# Patient Record
Sex: FEMALE | Race: WHITE | NOT HISPANIC OR LATINO | Employment: OTHER | URBAN - NONMETROPOLITAN AREA
[De-identification: names, ages, dates, MRNs, and addresses within clinical notes are randomized per-mention and may not be internally consistent; named-entity substitution may affect disease eponyms.]

---

## 2017-06-25 ENCOUNTER — HOSPITAL ENCOUNTER (EMERGENCY)
Facility: HOSPITAL | Age: 42
Discharge: HOME | End: 2017-06-25
Attending: PHYSICIAN ASSISTANT | Admitting: PHYSICIAN ASSISTANT
Payer: COMMERCIAL

## 2017-06-25 VITALS
RESPIRATION RATE: 16 BRPM | TEMPERATURE: 98.2 F | HEIGHT: 65 IN | DIASTOLIC BLOOD PRESSURE: 73 MMHG | SYSTOLIC BLOOD PRESSURE: 108 MMHG

## 2017-06-25 DIAGNOSIS — J20.9 ACUTE BRONCHITIS, UNSPECIFIED ORGANISM: ICD-10-CM

## 2017-06-25 PROCEDURE — 99213 OFFICE O/P EST LOW 20 MIN: CPT | Performed by: PHYSICIAN ASSISTANT

## 2017-06-25 PROCEDURE — 99213 OFFICE O/P EST LOW 20 MIN: CPT

## 2017-06-25 RX ORDER — AZITHROMYCIN 250 MG/1
TABLET, FILM COATED ORAL
Qty: 6 TABLET | Refills: 0 | Status: SHIPPED | OUTPATIENT
Start: 2017-06-25 | End: 2017-06-30

## 2017-06-25 ASSESSMENT — ENCOUNTER SYMPTOMS
DIZZINESS: 0
EYE DISCHARGE: 0
VOMITING: 0
COUGH: 1
DIARRHEA: 0
APPETITE CHANGE: 0
SINUS PRESSURE: 0
ABDOMINAL PAIN: 0
VOICE CHANGE: 0
CARDIOVASCULAR NEGATIVE: 1
LIGHT-HEADEDNESS: 0
EYE REDNESS: 0
NECK STIFFNESS: 0
PSYCHIATRIC NEGATIVE: 1
SORE THROAT: 0
FATIGUE: 1
FEVER: 0
NAUSEA: 0
NECK PAIN: 0
HEADACHES: 0
TROUBLE SWALLOWING: 0

## 2017-06-25 NOTE — ED PROVIDER NOTES
"  History     Chief Complaint   Patient presents with     Cough     \"I feel like my chest has congestion in it\".     The history is provided by the patient. No  was used.     Shania Hammond is a 42 year old female who has over 3 days of cough/congestion. Denies n/v/d/f/c. No ear pain. No sinus pain/pressure. Has decreased energy. No sore throat    I have reviewed the Medications, Allergies, Past Medical and Surgical History, and Social History in the Epic system.    Allergies:   Allergies   Allergen Reactions     Morphine Shortness Of Breath     Bactrim [Sulfamethoxazole W-Trimethoprim] Other (See Comments)     Very bad intolerance does not wish to take.     Amoxicillin Rash         No current facility-administered medications on file prior to encounter.   Current Outpatient Prescriptions on File Prior to Encounter:  Thiamine HCl (VITAMIN B-1 PO)    loratadine (CLARITIN) 10 MG tablet Take 10 mg by mouth daily   omeprazole (PRILOSEC) 40 MG capsule Take 1 capsule (40 mg) by mouth daily Take 30-60 minutes before a meal.       Patient Active Problem List   Diagnosis     Postpartum care and examination immediately after delivery     Tobacco abuse     Rosacea     Allergic rhinitis     Migraine       Past Surgical History:   Procedure Laterality Date      SECTION  ,      DILATION AND CURETTAGE  ?     EGD with biopsy      Gastroesophageal reflux disease     GYN SURGERY  2013     , leep       Social History   Substance Use Topics     Smoking status: Current Every Day Smoker     Packs/day: 0.50     Years: 1.00     Types: Cigarettes     Smokeless tobacco: Never Used      Comment: Passive Exposure: Yes     Alcohol use 0.0 oz/week     0 Standard drinks or equivalent per week      Comment: rare/ 4 drink max once a week or sometimes more       Most Recent Immunizations   Administered Date(s) Administered     Influenza (IIV3) 2008     Pneumococcal 23 valent 2012     " "TDAP Vaccine (Boostrix) 05/13/2010       BMI: Estimated body mass index is 30.79 kg/(m^2) as calculated from the following:    Height as of 7/8/16: 1.651 m (5' 5\").    Weight as of 7/8/16: 83.9 kg (185 lb).      Review of Systems   Constitutional: Positive for fatigue. Negative for appetite change and fever.   HENT: Positive for congestion. Negative for ear pain, sinus pressure, sore throat, trouble swallowing and voice change.    Eyes: Negative for discharge and redness.   Respiratory: Positive for cough.    Cardiovascular: Negative.    Gastrointestinal: Negative for abdominal pain, diarrhea, nausea and vomiting.   Genitourinary: Negative.    Musculoskeletal: Negative for neck pain and neck stiffness.   Skin: Negative for rash.   Neurological: Negative for dizziness, light-headedness and headaches.   Psychiatric/Behavioral: Negative.        Physical Exam   BP: 108/73  Heart Rate: 77  Temp: 98.2  F (36.8  C)  Resp: 16  Height: 165.1 cm (5' 5\")  Physical Exam   Constitutional: She is oriented to person, place, and time. She appears well-developed and well-nourished. No distress.   HENT:   Head: Normocephalic and atraumatic.   Right Ear: External ear normal.   Left Ear: External ear normal.   Mouth/Throat: Oropharynx is clear and moist.   Bilateral TMs/canals clear/wnl  No sinus TTP     Eyes: Conjunctivae and EOM are normal. Right eye exhibits no discharge. Left eye exhibits no discharge.   Neck: Normal range of motion. Neck supple.   Cardiovascular: Normal rate, regular rhythm and normal heart sounds.    Pulmonary/Chest: Effort normal. No respiratory distress.   Coarse BS RUL, does not clear with cough   Abdominal: Soft. Bowel sounds are normal. She exhibits no distension. There is no tenderness.   Neurological: She is alert and oriented to person, place, and time.   Skin: Skin is warm and dry. No rash noted. She is not diaphoretic.   Psychiatric: She has a normal mood and affect.   Nursing note and vitals " reviewed.      ED Course     ED Course     Procedures          Assessments & Plan (with Medical Decision Making)     I have reviewed the nursing notes.    I have reviewed the findings, diagnosis, plan and need for follow up with the patient.      Discharge Medication List as of 6/25/2017  4:35 PM      START taking these medications    Details   azithromycin (ZITHROMAX Z-FRANKI) 250 MG tablet Two tablets on the first day, then one tablet daily for the next 4 days, Disp-6 tablet, R-0, E-Prescribe             Final diagnoses:   Acute bronchitis, unspecified organism         Patient verbally educated and given appropriate education sheets for each of the diagnoses and has no questions.  Take OTC motrin or tylenol as directed on the bottle as needed.  Take prescription medications as directed.  Increase fluids, wash hands often.  Sleep in a recliner or with multiple pillows until this has resolved.  Follow up with your provider if symptoms increase or if concerns develop, return to the ER.  Kera Duran Certified   Physician Assistant  6/25/2017  6:24 PM  URGENT CARE CLINIC    6/25/2017   [facility]     Kera Duran PA  06/25/17 1824       Kera Duran PA  06/25/17 1826

## 2017-06-25 NOTE — ED AVS SNAPSHOT
[facility]    [address] [address]    Phone:  [phone]                                       Shania Hammond   MRN: [id number]    Department:  [facility]   Date of Visit:  6/25/2017           Patient Information     Date Of Birth          1975        Your diagnoses for this visit were:     Acute bronchitis, unspecified organism        You were seen by Kera Duran PA.      Follow-up Information     Follow up with Haile Mckay DO.    Specialty:  Family Practice    Why:  If symptoms worsen    Contact information:    [facility]  [address] [facility] [address] [phone]          Follow up with [facility].    Specialty:  EMERGENCY MEDICINE    Why:  If further concerns develop    Contact information:    [address] [address] [phone]    Additional information:    From [facility]: Take [address]. Turn left at [address]. Turn left at the first stoplight on [address]. At the first stop sign, take a right onto [address]. Take a left into the parking lot and continue through until you reach the North enterance of the building.       From [place]: Take [address]. Take the [address] ramp towards [place]. Turn left onto [address]. Take a slight right onto [address]. Turn left at the first stoplight on [address]. At the first stop sign, take a right onto [address]. Take a left into the parking lot and continue through until you reach the North enterance of the building.       From [place]: Take [address]. Take a right at [address]. At the first stop sign, take a right onto [address]. Take a left into the parking lot and continue through until you reach the North enterance of the building.         Discharge Instructions       Sleep in the recliner or with 4-5 pillows.    Discharge  References/Attachments     BRONCHITIS, ANTIOBIOTIC TREATMENT (ADULT) (ENGLISH)         Review of your medicines      START taking        Dose / Directions Last dose taken    azithromycin 250 MG tablet   Commonly known as:  ZITHROMAX Z-FRANKI   Quantity:  6 tablet        Two tablets on the first day, then one tablet daily for the next 4 days   Refills:  0          Our records show that you are taking the medicines listed below. If these are incorrect, please call your family doctor or clinic.        Dose / Directions Last dose taken    CLARITIN 10 MG tablet   Dose:  10 mg   Generic drug:  loratadine        Take 10 mg by mouth daily   Refills:  0        omeprazole 40 MG capsule   Commonly known as:  priLOSEC   Dose:  40 mg   Quantity:  30 capsule        Take 1 capsule (40 mg) by mouth daily Take 30-60 minutes before a meal.   Refills:  1        VITAMIN B-1 PO        Refills:  0        VITAMIN D (CHOLECALCIFEROL) PO        Take by mouth daily   Refills:  0                Prescriptions were sent or printed at these locations (1 Prescription)                   [facility] [address] [place], [place] - [address] [facility] [address]   [address] [facility] [address] [address]    Telephone:  [phone]   Fax:  [phone]   Hours:                  E-Prescribed (1 of 1)         azithromycin (ZITHROMAX Z-FRANKI) 250 MG tablet                Orders Needing Specimen Collection     None      Pending Results     No orders found from 6/23/2017 to 6/26/2017.            Pending Culture Results     No orders found from 6/23/2017 to 6/26/2017.            Thank you for choosing [facility]       Thank you for choosing [facility] for your care. Our goal is always to provide you with excellent care. Hearing back from our patients is one way we can continue to improve our services. Please take a few minutes to complete the written survey that you may receive in the mail after you visit with us. Thank you!        [employer] Information     [employer] gives you secure access to your  electronic health record. If you see a primary care provider, you can also send messages to your care team and make appointments. If you have questions, please call your primary care clinic.  If you do not have a primary care provider, please call [phone] and they will assist you.        Care EveryWhere ID     This is your Care EveryWhere ID. This could be used by other organizations to access your [place] medical records  [id number]        Equal Access to Services     CAMILO CESAR : Rafat Robbins, mario thrasher, amanuel kaalgabbi ferrari, sanket napoles. So [facility] [phone].    ATENCIÓN: Si habla español, tiene a sawyer disposición servicios gratuitos de asistencia lingüística. Llame al [phone].    We comply with applicable federal civil rights laws and [place] laws. We do not discriminate on the basis of race, color, national origin, age, disability sex, sexual orientation or gender identity.            After Visit Summary       This is your record. Keep this with you and show to your community pharmacist(s) and doctor(s) at your next visit.

## 2017-06-25 NOTE — ED NOTES
Ambulated into . C/O fluid in her lungs. This has occurred within the last three days. No temp. No medications prior to visit.

## 2017-06-25 NOTE — ED AVS SNAPSHOT
[facility]    [address] [place] [address]    Phone:  [phone]                                       Shania Hammond   MRN: [id number]    Department:  [facility]   Date of Visit:  6/25/2017           After Visit Summary Signature Page     I have received my discharge instructions, and my questions have been answered. I have discussed any challenges I see with this plan with the nurse or doctor.    ..........................................................................................................................................  Patient/Patient Representative Signature      ..........................................................................................................................................  Patient Representative Print Name and Relationship to Patient    ..................................................               ................................................  Date                                            Time    ..........................................................................................................................................  Reviewed by Signature/Title    ...................................................              ..............................................  Date                                                            Time

## 2017-08-12 ENCOUNTER — HEALTH MAINTENANCE LETTER (OUTPATIENT)
Age: 42
End: 2017-08-12

## 2017-09-11 ENCOUNTER — HOSPITAL ENCOUNTER (EMERGENCY)
Facility: HOSPITAL | Age: 42
Discharge: HOME | End: 2017-09-11
Attending: NURSE PRACTITIONER | Admitting: NURSE PRACTITIONER
Payer: COMMERCIAL

## 2017-09-11 VITALS
OXYGEN SATURATION: 99 % | TEMPERATURE: 98.3 F | SYSTOLIC BLOOD PRESSURE: 125 MMHG | RESPIRATION RATE: 16 BRPM | DIASTOLIC BLOOD PRESSURE: 70 MMHG

## 2017-09-11 DIAGNOSIS — R09.81 CONGESTION OF PARANASAL SINUS: ICD-10-CM

## 2017-09-11 PROCEDURE — 99213 OFFICE O/P EST LOW 20 MIN: CPT

## 2017-09-11 PROCEDURE — 99213 OFFICE O/P EST LOW 20 MIN: CPT | Performed by: NURSE PRACTITIONER

## 2017-09-11 RX ORDER — MOMETASONE FUROATE MONOHYDRATE 50 UG/1
2 SPRAY, METERED NASAL DAILY
Qty: 1 BOX | Refills: 0 | Status: SHIPPED | OUTPATIENT
Start: 2017-09-11 | End: 2019-11-07

## 2017-09-11 ASSESSMENT — ENCOUNTER SYMPTOMS
COUGH: 0
FEVER: 0
MUSCULOSKELETAL NEGATIVE: 1
DIARRHEA: 0
SORE THROAT: 0
RHINORRHEA: 0
SINUS PRESSURE: 1
RESPIRATORY NEGATIVE: 1
HEMATOLOGIC/LYMPHATIC NEGATIVE: 1
EYES NEGATIVE: 1
HEADACHES: 1
CARDIOVASCULAR NEGATIVE: 1
PHOTOPHOBIA: 0
CONSTITUTIONAL NEGATIVE: 1
FACIAL SWELLING: 0
EYE REDNESS: 0
TROUBLE SWALLOWING: 0
NAUSEA: 0
VOMITING: 0
GASTROINTESTINAL NEGATIVE: 1

## 2017-09-11 NOTE — ED AVS SNAPSHOT
[facility]    [address] [place] [address]    Phone:  [phone]                                       Shania Hammond   MRN: [id number]    Department:  [facility]   Date of Visit:  9/11/2017           After Visit Summary Signature Page     I have received my discharge instructions, and my questions have been answered. I have discussed any challenges I see with this plan with the nurse or doctor.    ..........................................................................................................................................  Patient/Patient Representative Signature      ..........................................................................................................................................  Patient Representative Print Name and Relationship to Patient    ..................................................               ................................................  Date                                            Time    ..........................................................................................................................................  Reviewed by Signature/Title    ...................................................              ..............................................  Date                                                            Time

## 2017-09-11 NOTE — ED NOTES
Pt presents today alone for c/o sinus pain behind right eye, inside right ear. Has been taking Ibuprofen for the pain she feels much been sitting upright but laying down causes intense pain.

## 2017-09-11 NOTE — ED PROVIDER NOTES
History     Chief Complaint   Patient presents with     Sinusitis     started yesterday       The history is provided by the patient. No  was used.     Shania Hammond is a 42 year old female who presents with right sided HA behind her right eye.  She is concerned that this may be a sinusitis.  She has had sx for one day. She has mainly had pressure behind her right eye.  No rhinorrhea, nasal congestion, fever.  She has not taken any decongestant.  She has taken ibuprofen which seemed to help the throbbing some    I have reviewed the Medications, Allergies, Past Medical and Surgical History, and Social History in the Epic system.    {    Review of Systems   Constitutional: Negative.  Negative for fever.   HENT: Positive for ear pain (right ear) and sinus pressure. Negative for congestion, facial swelling, postnasal drip, rhinorrhea, sore throat and trouble swallowing.    Eyes: Negative.  Negative for photophobia and redness.   Respiratory: Negative.  Negative for cough.    Cardiovascular: Negative.    Gastrointestinal: Negative.  Negative for diarrhea, nausea and vomiting.   Musculoskeletal: Negative.    Skin: Negative.    Neurological: Positive for headaches (behind right eye).   Hematological: Negative.        Physical Exam   BP: 125/70  Heart Rate: 79  Temp: 98.3  F (36.8  C)  Resp: 16  SpO2: 99 %  Physical Exam   Constitutional: She is oriented to person, place, and time. She appears well-developed and well-nourished. No distress.   HENT:   Head: Normocephalic and atraumatic.   Right Ear: External ear normal.   Left Ear: External ear normal.   Mouth/Throat: No oropharyngeal exudate.   TM's are translucent ,BLM and light reflexes are visualized  Bilaterally  Nasal mucosa is swollen and erythematous,and pale, there is thin mucopurulent drainage present  Pressure behind the right eye  Posterior pharynx with patchy erythema   Eyes: Conjunctivae and EOM are normal. Pupils are equal, round, and  reactive to light. Right eye exhibits no discharge. Left eye exhibits no discharge.   She has minimal swelling of her right eyelid   Neck: Normal range of motion. Neck supple.   Cardiovascular: Normal rate, regular rhythm and normal heart sounds.  Exam reveals no gallop and no friction rub.    No murmur heard.  Pulmonary/Chest: Effort normal and breath sounds normal. She has no wheezes. She has no rales.   Abdominal: Soft. Bowel sounds are normal. She exhibits no mass. There is no tenderness. There is no rebound and no guarding.   No CVA tenderness   Musculoskeletal: Normal range of motion.   Lymphadenopathy:     She has no cervical adenopathy.   Neurological: She is alert and oriented to person, place, and time.   Skin: Skin is warm and dry. No rash noted. She is not diaphoretic.   Nursing note and vitals reviewed.      ED Course     ED Course     Procedures               Labs Ordered and Resulted from Time of ED Arrival Up to the Time of Departure from the ED - No data to display    Assessments & Plan (with Medical Decision Making)     I have reviewed the nursing notes.    Pathophysiology, possible etiology and treatment with potential outcomes, risks, benefits, and alternatives discussed to the best of my ability      Aggressive decongestant advised.  Start the nasal steroid spray and if it is not working after 2 weeks stop.       I have reviewed the findings, diagnosis, plan and need for follow up with the patient.      Discharge Medication List as of 9/11/2017 11:21 AM      START taking these medications    Details   mometasone (NASONEX) 50 MCG/ACT spray Spray 2 sprays into both nostrils daily, Disp-1 Box, R-0, E-Prescribe             Final diagnoses:   Congestion of paranasal sinus   Pt verbalizes understanding and agreement with plan.  Follow up for worsening symptoms      9/11/2017   [facility]     Tierra Pickard NP  09/11/17 9220

## 2017-09-11 NOTE — ED AVS SNAPSHOT
[facility]    [address] [address]    Phone:  [phone]                                       Shania Hammond   MRN: [id number]    Department:  [facility]   Date of Visit:  9/11/2017           Patient Information     Date Of Birth          1975        Your diagnoses for this visit were:     Congestion of paranasal sinus        You were seen by Tierra Pickard NP.      Follow-up Information     Follow up with Haile Mckay DO.    Specialty:  Family Practice    Why:  As needed, If symptoms worsen    Contact information:    [facility]  [address]  [facility] [address] [phone]          Follow up with [facility].    Specialty:  EMERGENCY MEDICINE    Why:  As needed, If symptoms worsen    Contact information:    [address] [address] [phone]    Additional information:    From [facility]: Take [address]. Turn left at [address]. Turn left at the first stoplight on [address]. At the first stop sign, take a right onto [address]. Take a left into the parking lot and continue through until you reach the North enterance of the building.       From [place]: Take [address]. Take the [address] ramp towards [place]. Turn left onto [address]. Take a slight right onto [address]. Turn left at the first stoplight on [address]. At the first stop sign, take a right onto [address]. Take a left into the parking lot and continue through until you reach the North enterance of the building.       From [place]: Take [address]. Take a right at [address]. At the first stop sign, take a right onto [address]. Take a left into the parking lot and continue through until you reach the North enterance of the building.       Discharge References/Attachments     SINUSITIS (NO ANTIBIOTICS) (ENGLISH)         Review of your  medicines      START taking        Dose / Directions Last dose taken    mometasone 50 MCG/ACT spray   Commonly known as:  NASONEX   Dose:  2 spray   Quantity:  1 Box        Spray 2 sprays into both nostrils daily   Refills:  0          Our records show that you are taking the medicines listed below. If these are incorrect, please call your family doctor or clinic.        Dose / Directions Last dose taken    CLARITIN 10 MG tablet   Dose:  10 mg   Generic drug:  loratadine        Take 10 mg by mouth daily   Refills:  0        omeprazole 40 MG capsule   Commonly known as:  priLOSEC   Dose:  40 mg   Quantity:  30 capsule        Take 1 capsule (40 mg) by mouth daily Take 30-60 minutes before a meal.   Refills:  1        VITAMIN B-1 PO        Refills:  0        VITAMIN D (CHOLECALCIFEROL) PO        Take by mouth daily   Refills:  0                Prescriptions were sent or printed at these locations (1 Prescription)                   [facility] [address] [facility] [place] [facility] [address] [facility] [address]   [address] [facility] [address], [place] [address]    Telephone:  [phone]   Fax:  [phone]   Hours:                  E-Prescribed (1 of 1)         mometasone (NASONEX) 50 MCG/ACT spray                Orders Needing Specimen Collection     None      Pending Results     No orders found from 9/9/2017 to 9/12/2017.            Pending Culture Results     No orders found from 9/9/2017 to 9/12/2017.            Thank you for choosing [facility]       Thank you for choosing [facility] for your care. Our goal is always to provide you with excellent care. Hearing back from our patients is one way we can continue to improve our services. Please take a few minutes to complete the written survey that you may receive in the mail after you visit with us. Thank you!        [employer] Information     [employer] gives you secure access to your electronic health record. If you see a primary care provider, you can also send messages to your care team and make appointments. If  you have questions, please call your primary care clinic.  If you do not have a primary care provider, please call [phone] and they will assist you.        Care EveryWhere ID     This is your Care EveryWhere ID. This could be used by other organizations to access your [place] medical records  [id number]        Equal Access to Services     CAMILO CESAR : Rafat Robbins, mario thrasher, sanket moe. So [facility] [phone].    ATENCIÓN: Si habla español, tiene a sawyer disposición servicios gratuitos de asistencia lingüística. Llame al [phone].    We comply with applicable federal civil rights laws and [place] laws. We do not discriminate on the basis of race, color, national origin, age, disability sex, sexual orientation or gender identity.            After Visit Summary       This is your record. Keep this with you and show to your community pharmacist(s) and doctor(s) at your next visit.

## 2018-08-19 ENCOUNTER — HEALTH MAINTENANCE LETTER (OUTPATIENT)
Age: 43
End: 2018-08-19

## 2019-02-04 ENCOUNTER — ANCILLARY PROCEDURE (OUTPATIENT)
Dept: MAMMOGRAPHY | Facility: OTHER | Age: 44
End: 2019-02-04
Attending: FAMILY MEDICINE
Payer: COMMERCIAL

## 2019-02-04 DIAGNOSIS — Z12.31 VISIT FOR SCREENING MAMMOGRAM: ICD-10-CM

## 2019-02-04 PROCEDURE — 77067 SCR MAMMO BI INCL CAD: CPT | Mod: TC

## 2019-02-04 PROCEDURE — 77063 BREAST TOMOSYNTHESIS BI: CPT | Mod: TC

## 2019-07-31 ENCOUNTER — DOCUMENTATION ONLY (OUTPATIENT)
Dept: SLEEP MEDICINE | Facility: HOSPITAL | Age: 44
End: 2019-07-31

## 2019-07-31 NOTE — PROGRESS NOTES
[facility]       Name: Shania Hammond MRN# [id number]   Age: 44 year old YOB: 1975     Stop Bang questionnaire completed with a score of >3 to allow for HST     Have you been told you snore loudly (louder than talking or loud enough to be heard through doors)? NO    Do you often feel tired, fatigued, or sleepy during the daytime? YES    Has anyone observed you stop breathing during your sleep? NO    Do you have or are you being treated for high blood pressure? NO    Is your BMI greater than 35? NO    Is your neck size circumference 16 inches or greater?      Are you over 50 years old? NO    Stop Bang Score (# of yes): 1  SLEEP HISTORY QUESTIONNAIRE    Please describe the main reason for your sleep appointment? Fall asleep very easily, can stay asleep for a long time, hard time waking up.    How long has this been a problem? At least 6 months    Have you been diagnosed with a sleep problem in the past? NO    If so, what?      What treatment was recommended?      Have you had a sleep study in the past? NO    If yes, where and when?      Sleep Habits:   Do you read in bed? Yes  Do you eat in bed? No  Do you watch TV in bed? No  Do you work in bed? No  Do you use a phone or computer in bed? Yes    Is you sleep disturbed by:   Bed partner: No  Children: No  Noise: No   Pets: No  Other:        On two or more nights per week, do you drink alcohol to help you fall asleep?NO    On two or more nights per week, do you take melatonin to help you fall asleep? NO    On two or more nights per week, do you take over the counter medicine to fall asleep?  NO    Do you take drinks with caffeine (coffee, tea, soda, energy drinks)? YES    Do you have 3 or more caffeine drinks in a day? NO    Do you have caffeine drinks within 6 hours of bedtime? NO    Do you smoke or use tobacco? YES    Do you exercise? NO    Sleep Routine:   Using a 24 Hour Clock    What time do you usually get into bed on workdays? 12-1 am    Weekend/non  work days? 3 am    What time do you get out of bed on workdays? 9 am      Weekend/non work days?11 am    Do you work the evening or night shift or do your shifts rotate? NO    How long does it usually take to fall to sleep? 5-10 min    How many times do you wake during the night? 0    How much time do you feel that you are awake during the entire night? 0    How long does it take for you to fall back to sleep after you wake up? 5 min    Why do you think you wake up? bathroom    What do you do when you wake up? bathroom    How much sleep do you think you get on work nights? 7-8    How much sleep do you think you get on weekends/non work days? 7-8    How much sleep do you think you need to feel your best? 11    How many days during a week do you take a nap on average? 1    What is the average length of your naps? 3 hours    Do you feel better after taking a nap? NO    If you could chose the best sleep schedule for you, what time would you go to bed? 11 pm  What time would you get up? 9-10    Do you read in bed? YES    Do you eat in bed? NO    Do you watch TV in bed? NO    Do you do work in bed? NO    Do you use a computer or phone in bed? YES    Sleep Disruptions?   Leg movements:  Do you ever have restless, crawling, aching or other unusual feelings in your legs? NO    Do you ever wake yourself by kicking your legs during the night? NO    Are the sheets and blankets messed up or tossed about when you get up? NO    Night-time behaviors:   Do you have nightmares or night terrors? NO   How often?      Have you had times when you were sleep walking? NO    Have you been seen doing anything unusual while you sleep at nights? NO  What?    How often?      Have you ever hurt yourself or someone else while you were sleeping? NO  Please describe:      Do you clench or grind your teeth during the night? NO    Sleep Apnea (pauses in breathing during sleep):  Do you wake with a headache in the morning? YES  How often? 2-3  week    Does your bed partner, family or friends ever say that you snore? YES  How many nights per week do you snore?    Can snoring be heard outside the bedroom? no    Do you ever wake yourself up from snoring, gasping or choking? NO    Have you ever been told that you stop breathing or have pauses in your breathing? NO    Do you wake in the morning with a dry throat or mouth? NO    Do you have trouble breathing through your nose? NO    Do you have problems with heartburn, reflux or a hiatal hernia? YES    Which positions do you usually sleep in? (stomach, back, sides, all) all    Do you use oxygen or any other medical equipment when you sleep? NO    Do members of your family (related by blood) snore? YES    Have any members of your family been diagnosed with with sleep apnea? NO    Do other members of your family have restless leg? NO    Do other members of your family have sleep walking? NO    Have you ever had an accident, or near accident due to sleepiness while driving? NO    Does your sleepiness affect your work on the job or at school? NO    Do you ever fall asleep by accident while doing a task? NO    Have you had sudden muscle weakness when laughing, angry or surprised? NO    Have you ever been unable to move your body when falling asleep or waking up? NO    Do you ever have trouble  your dreams from real life events? YES  Please describe: my dreams are very realistic and I feel like I can sleep longer not realizing that they are dreams    Physical Health: (including illness and injury): During the past 30 days, on how many days was your physical health not good? 10/30 days     Mental Health: (including stress, depression, and problems with emotions): During the last 30 days, how may days was your mental health not good? 10/30 days.     During the past 30 days, on how many days did poor physical or mental health keep you from doing your usual activities? This might be self-care, work, or play?  30 days.     Social History:   Marital status:     Who lives in your home with you? , kids    Mother (alive or dead)? alive If has , from what?    Father (alive or dead)? dead If has , from what? Heart attack    Siblings: YES  Have any ? NO  If so, from what?      Currently working? YES  If yes, work: [employer]  Former jobs:       Sleepiness Scale:   Sitting and reading 2   Watching TV 2   Sitting in a public place 0   Riding in a car 2   Lying down to rest in the afternoon 3   Sitting and talking to someone 0   Sitting quietly after a lunch without alcohol 1   In a car, stopping for a few minutes in traffic 0       Surgical History:   Past Surgical History:   Procedure Laterality Date      SECTION  ,      DILATION AND CURETTAGE  ?     EGD with biopsy      Gastroesophageal reflux disease     GYN SURGERY  2013     , leep       Medical Conditions:   Past Medical History:   Diagnosis Date     Abnormal weight gain     Resolved; Pineda Angel     Allergic rhinitis, cause unspecified 2011     Asthma      Depressive disorder, not elsewhere classified 2007     Diabetes (H)      Migraine      Tobacco abuse        Medications:   Current Outpatient Medications   Medication Sig     loratadine (CLARITIN) 10 MG tablet Take 10 mg by mouth daily     mometasone (NASONEX) 50 MCG/ACT spray Spray 2 sprays into both nostrils daily     omeprazole (PRILOSEC) 40 MG capsule Take 1 capsule (40 mg) by mouth daily Take 30-60 minutes before a meal.     Thiamine HCl (VITAMIN B-1 PO)      VITAMIN D, CHOLECALCIFEROL, PO Take by mouth daily     No current facility-administered medications for this visit.        Are you currently having any of the following symptoms?   General:   Obvious weight gain or loss NO  Fever, chills or sweats NO  Drug allergies: YES    Eyes:   Changes in vision NO  Blind spots NO  Double vision NO  Other      Ear, Nose and Throat:   Ear pain  NO  Sore throat NO  Sinus pain YES  Post-nasal drip NO  Runny nose NO  Bloody nose NO    Heart:   Rapid or irregular heart beat NO  Chest pain or pressure NO  Out of breath when lying down NO  Swelling in feet or legs YES  High blood pressure NO  Heart disease NO    Nervous system   Headaches YES  Weakness in arms or legs YES  Numbness in arms of legs NO  Other:      Skin  Rashes NO  New moles or skin changes NO  Other      Lungs  Shortness of breath at rest NO  Shortness of breath with activity YES  Dry cough NO  Coughing up mucous or phlegm NO  Coughing up blood NO  Wheezing when breathing YES    Lymph System  Swollen lymph nodes NO  New lumps or bumps NO  Changes in breasts or discharge NO    Digestive System   Nausea or vomiting NO  Loose or watery stools NO  Hard, dry stools (constipation) NO  Fat or grease in stools NO  Blood in stools NO  Stools are black or bloody NO  Abdominal (belly) pain NO    Urinary Tract   Pain when you urinate (pee) NO  Blood in your urine NO  Urinate (pee) more than normal NO  Irregular periods NO    Muscles and bones   Muscle pain NO  Joint or bone pain NO  Swollen joints NO  Other      Glands  Increased thirst or urination NO  Diabetes NO  Morning glucose:    Afternoon glucose:      Mental Health  Depression NO  Anxiety NO  Other mental health issues:

## 2019-08-01 NOTE — PROGRESS NOTES
"Chart review prior to sleep testing.    Patient Summary:  43 yo F with PMHx of migraines, diabetes, depression who is referred for sleepiness.    STOP-BANG score of 2.  [place] score of 10.  BMI of is unknown, no recent vitals for review.    Per questionnaire: \"Fall asleep very easily, can stay asleep for a long time, hard time waking up.\"    Sxs for 6+ months.  No prior sleep testing.    Sleep pattern:  Work days.  In bed between MN to 1am, fall asleep quickly, no awakenings, up at 9am.  Weekends.  In bed ~3am and up at 11am.  Napping.  ~1 nap per week, for 3 hours.  Estimated needed TST of 11 hours.    Yes to snoring.  No to observed apnea, family history.    No to cataplexy, sleep paralysis.  Yes to possible hypnagogic or hypnopompic hallucinations.    A/P:  1.)  Excessive daytime sleepiness per history, though Owanka is borderline.  2.)  Low clinical concern for sleep-disordered breathing.  3.)  Unclear current medications, vitals or ongoing medical issues.   - Given low clinical concern for MISHA, primary concern of sleepiness and somewhat unclear past medical history, I would recommend a clinical consult first to see if indication for hypersomnia testing and potential medical work-up if indicated.    Willian Valera MD  "

## 2019-08-20 ENCOUNTER — TELEPHONE (OUTPATIENT)
Dept: SLEEP MEDICINE | Facility: HOSPITAL | Age: 44
End: 2019-08-20

## 2019-09-10 ENCOUNTER — OFFICE VISIT (OUTPATIENT)
Dept: SLEEP MEDICINE | Facility: HOSPITAL | Age: 44
End: 2019-09-10
Attending: FAMILY MEDICINE
Payer: COMMERCIAL

## 2019-09-10 VITALS
DIASTOLIC BLOOD PRESSURE: 76 MMHG | RESPIRATION RATE: 12 BRPM | HEIGHT: 65 IN | WEIGHT: 200 LBS | HEART RATE: 85 BPM | OXYGEN SATURATION: 99 % | BODY MASS INDEX: 33.32 KG/M2 | SYSTOLIC BLOOD PRESSURE: 124 MMHG

## 2019-09-10 DIAGNOSIS — G47.10 HYPERSOMNIA: ICD-10-CM

## 2019-09-10 DIAGNOSIS — R06.83 SNORING: Primary | ICD-10-CM

## 2019-09-10 PROCEDURE — 99203 OFFICE O/P NEW LOW 30 MIN: CPT | Performed by: FAMILY MEDICINE

## 2019-09-10 PROCEDURE — G0463 HOSPITAL OUTPT CLINIC VISIT: HCPCS

## 2019-09-10 ASSESSMENT — MIFFLIN-ST. JEOR: SCORE: 1560.13

## 2019-09-12 ENCOUNTER — TRANSFERRED RECORDS (OUTPATIENT)
Dept: HEALTH INFORMATION MANAGEMENT | Facility: CLINIC | Age: 44
End: 2019-09-12

## 2019-09-12 LAB
ALT SERPL-CCNC: 16 U/L (ref 18–65)
AST SERPL-CCNC: 18 U/L (ref 10–30)
CHOLEST SERPL-MCNC: 183 MG/DL
HDLC SERPL-MCNC: 40 MG/DL
LDLC SERPL CALC-MCNC: 120 MG/DL
TRIGL SERPL-MCNC: 117 MG/DL

## 2019-09-12 NOTE — PROGRESS NOTES
"  Sleep Consultation:    Date on this visit: 9/10/2019    Shania Hammond is sent by Haile Mckay for a sleep consultation regarding excessive daytime sleepiness, difficulty awakening in morning, excessive sleep need.    Primary Physician: Haile Mckay     43 yo F with PMHx of migraines, GERD, gestational diabetes, depression, chronic venous insufficiency in R leg who is referred for sleepiness.     STOP-BANG score of 2.  Bronson score of 10.  BMI of is ~34.     Per questionnaire: \"Fall asleep very easily, can stay asleep for a long time, hard time waking up.\"     Sxs for 6+ months.  No prior sleep testing.    She feels that she has always seem to need a lot of sleep or be able to sleep as much as allowed.  She notes that her brother and sister were diagnosed with narcolepsy - dad passed away and is unclear if he underwent formal sleep testing but her sister does sound as if she did undergo PSG and MSLT.  But, she is unsure if she is on medication.    She is concerned about her sleep now since she is having difficulties waking up in the morning and has been increasingly late for work.    She feels she has had TSH, CBC, CMP checked in the past, but is unclear if checked within the past year.     Sleep pattern:  Work days.  In bed between MN to 1am, fall asleep quickly, no awakenings, up at 9am.  Weekends.  In bed ~3am and up at 11am.  Napping.  ~1 nap per week, for 3 hours.  Estimated needed TST of 11 hours.     Yes to snoring.  No to observed apnea, family history.     No to cataplexy, sleep paralysis.  Yes to hypnopompic hallucinations.    SHx:  Works day hours, ~9:30am - 6pm.  4 children, ages 21 / 19 / 10 / 5 yo.    Allergies:    Allergies   Allergen Reactions     Morphine Shortness Of Breath     Bactrim [Sulfamethoxazole W-Trimethoprim] Other (See Comments)     Very bad intolerance does not wish to take.     Amoxicillin Rash       Medications:    Current Outpatient Medications   Medication Sig Dispense " Refill     loratadine (CLARITIN) 10 MG tablet Take 10 mg by mouth daily       omeprazole (PRILOSEC) 40 MG capsule Take 1 capsule (40 mg) by mouth daily Take 30-60 minutes before a meal. 30 capsule 1     Thiamine HCl (VITAMIN B-1 PO)        VITAMIN D, CHOLECALCIFEROL, PO Take by mouth daily       mometasone (NASONEX) 50 MCG/ACT spray Spray 2 sprays into both nostrils daily (Patient not taking: Reported on 9/10/2019) 1 Box 0       Problem List:  Patient Active Problem List    Diagnosis Date Noted     Rosacea 2015     Priority: Medium     Allergic rhinitis 2015     Priority: Medium     Problem list name updated by automated process. Provider to review       Migraine 2015     Priority: Medium     Tobacco abuse      Priority: Medium     Postpartum care and examination immediately after delivery 2013     Priority: Medium        Past Medical/Surgical History:  Past Medical History:   Diagnosis Date     Abnormal weight gain     Resolved; Pineda Angel     Allergic rhinitis, cause unspecified 2011     Asthma      Depressive disorder, not elsewhere classified 2007     Diabetes (H)      Migraine      Tobacco abuse      Past Surgical History:   Procedure Laterality Date      SECTION  ,      DILATION AND CURETTAGE  ?     EGD with biopsy      Gastroesophageal reflux disease     GYN SURGERY       , leep       Social History:  Social History     Socioeconomic History     Marital status:      Spouse name: Not on file     Number of children: 4     Years of education: Not on file     Highest education level: Not on file   Occupational History     Not on file   Social Needs     Financial resource strain: Not on file     Food insecurity:     Worry: Not on file     Inability: Not on file     Transportation needs:     Medical: Not on file     Non-medical: Not on file   Tobacco Use     Smoking status: Current Every Day Smoker     Packs/day: 0.50      Years: 1.00     Pack years: 0.50     Types: Cigarettes     Smokeless tobacco: Never Used     Tobacco comment: Passive Exposure: Yes   Substance and Sexual Activity     Alcohol use: Yes     Alcohol/week: 0.0 oz     Comment: rare/ 4 drink max once a week or sometimes more     Drug use: No     Sexual activity: Yes     Partners: Male     Birth control/protection: Surgical   Lifestyle     Physical activity:     Days per week: Not on file     Minutes per session: Not on file     Stress: Not on file   Relationships     Social connections:     Talks on phone: Not on file     Gets together: Not on file     Attends [employer] service: Not on file     Active member of club or organization: Not on file     Attends meetings of clubs or organizations: Not on file     Relationship status: Not on file     Intimate partner violence:     Fear of current or ex partner: Not on file     Emotionally abused: Not on file     Physically abused: Not on file     Forced sexual activity: Not on file   Other Topics Concern      Service Not Asked     Blood Transfusions Yes     Caffeine Concern Yes     Comment: Coffee - 2 cups daily      Occupational Exposure Not Asked     Hobby Hazards Not Asked     Sleep Concern Not Asked     Stress Concern Not Asked     Weight Concern Not Asked     Special Diet Not Asked     Back Care Not Asked     Exercise Not Asked     Bike Helmet Not Asked     Seat Belt Not Asked     Self-Exams Not Asked     Parent/sibling w/ CABG, MI or angioplasty before 65F 55M? No   Social History Narrative    Works at [employer] in [place] [employer].        Family History:  Family History   Problem Relation Age of Onset     Allergies Sister      Cancer Mother      Diabetes Mother      Cancer Son         Neuroblastoma     C.A.D. Father          of MI at age 63     Attention Deficit Disorder Sister      Asthma Sister      Depression Sister      Hypertension Mother      Irritable Bowel Syndrome Mother      Learning Disorder  "Sister      Migraines Sister      Osteoporosis Mother        Review of Systems:  A complete review of systems reviewed by me is negative with the exeption of what has been mentioned in the history of present illness.    Physical Examination:  Vitals: /76   Pulse 85   Resp 12   Ht 1.638 m (5' 4.5\")   Wt 90.7 kg (200 lb)   SpO2 99%   BMI 33.80 kg/m    BMI= Body mass index is 33.8 kg/m .    Neck Cir (cm): 37 cm    No flowsheet data found.         GENERAL APPEARANCE: healthy, alert, no distress and over weight  RESP: lungs clear to auscultation - no rales, rhonchi or wheezes  CV: regular rates and rhythm, normal S1 S2, no S3 or S4 and no murmur, click or rub  PSYCH: mentation appears normal and affect normal/bright    Impression/Plan:    45 yo F with PMHx of migraines, GERD, gestational diabetes, depression, chronic venous insufficiency in R leg who is referred for sleepiness.    1.)  Chronic fatigue vs hypersomnia  2.)  Potential longer sleep need  3.)  Not felt to be increased probability of MISHA with STOP-BANG score of 2.  4.)  Unclear family history of narcolepsy  - Clinically, her story would not be highly suggestive of narcolepsy with age of onset of symptoms, only hypnopompic hallucinations, borderline [place].  Can't rule out idiopathic hypersomnia with long sleep time.  - Also recommended we review recent TSH, CBC, CMP, vitamin D level.  - Will plan for formal hypersomnia testing with actigraphy x2 weeks linked to PSG and MSLT.    Plan as given to patient as above.    I have spent 30 minutes with this patient today in which greater than 50% of this time was spent in the counseling / coordination of care regarding MISHA.    Willian Valera MD    CC: Haile Mckay        "

## 2019-09-17 ENCOUNTER — DOCUMENTATION ONLY (OUTPATIENT)
Dept: SLEEP MEDICINE | Facility: HOSPITAL | Age: 44
End: 2019-09-17

## 2019-09-17 NOTE — PROGRESS NOTES
Received lab results from [facility] that were requested by Dr. Valera having them scanned in

## 2019-09-17 NOTE — Clinical Note
The lab results you wanted came I sent them to scan most likely will not happen until tomorrow afternoon as the stuff to scan has gone down already today.

## 2019-11-07 ENCOUNTER — OFFICE VISIT (OUTPATIENT)
Dept: OBGYN | Facility: OTHER | Age: 44
End: 2019-11-07
Attending: NURSE PRACTITIONER
Payer: COMMERCIAL

## 2019-11-07 VITALS
SYSTOLIC BLOOD PRESSURE: 118 MMHG | DIASTOLIC BLOOD PRESSURE: 68 MMHG | OXYGEN SATURATION: 99 % | HEIGHT: 65 IN | BODY MASS INDEX: 33.32 KG/M2 | HEART RATE: 80 BPM | WEIGHT: 200 LBS

## 2019-11-07 DIAGNOSIS — R10.2 PELVIC PAIN IN FEMALE: Primary | ICD-10-CM

## 2019-11-07 DIAGNOSIS — Z11.51 SCREENING FOR HUMAN PAPILLOMAVIRUS: ICD-10-CM

## 2019-11-07 DIAGNOSIS — N92.0 MENORRHAGIA WITH REGULAR CYCLE: ICD-10-CM

## 2019-11-07 LAB
ERYTHROCYTE [DISTWIDTH] IN BLOOD BY AUTOMATED COUNT: 14.2 % (ref 10–15)
HCT VFR BLD AUTO: 39.1 % (ref 35–47)
HGB BLD-MCNC: 12.9 G/DL (ref 11.7–15.7)
MCH RBC QN AUTO: 28.8 PG (ref 26.5–33)
MCHC RBC AUTO-ENTMCNC: 33 G/DL (ref 31.5–36.5)
MCV RBC AUTO: 87 FL (ref 78–100)
PLATELET # BLD AUTO: 275 10E9/L (ref 150–450)
RBC # BLD AUTO: 4.48 10E12/L (ref 3.8–5.2)
WBC # BLD AUTO: 5.9 10E9/L (ref 4–11)

## 2019-11-07 PROCEDURE — 85027 COMPLETE CBC AUTOMATED: CPT | Performed by: NURSE PRACTITIONER

## 2019-11-07 PROCEDURE — G0123 SCREEN CERV/VAG THIN LAYER: HCPCS | Performed by: NURSE PRACTITIONER

## 2019-11-07 PROCEDURE — 36415 COLL VENOUS BLD VENIPUNCTURE: CPT | Performed by: NURSE PRACTITIONER

## 2019-11-07 PROCEDURE — 87624 HPV HI-RISK TYP POOLED RSLT: CPT | Performed by: NURSE PRACTITIONER

## 2019-11-07 PROCEDURE — 99386 PREV VISIT NEW AGE 40-64: CPT | Performed by: NURSE PRACTITIONER

## 2019-11-07 RX ORDER — PANTOPRAZOLE SODIUM 20 MG/1
40 TABLET, DELAYED RELEASE ORAL DAILY
COMMUNITY
End: 2023-06-22

## 2019-11-07 ASSESSMENT — PAIN SCALES - GENERAL: PAINLEVEL: NO PAIN (0)

## 2019-11-07 ASSESSMENT — MIFFLIN-ST. JEOR: SCORE: 1560.13

## 2019-11-07 NOTE — NURSING NOTE
"Chief Complaint   Patient presents with     Gyn Exam       Initial /68   Pulse 80   Ht 1.638 m (5' 4.5\")   Wt 90.7 kg (200 lb)   SpO2 99%   BMI 33.80 kg/m   Estimated body mass index is 33.8 kg/m  as calculated from the following:    Height as of this encounter: 1.638 m (5' 4.5\").    Weight as of this encounter: 90.7 kg (200 lb).  Medication Reconciliation: complete  Yessica Rosenbaum LPN    "

## 2019-11-08 NOTE — PROGRESS NOTES
CC:  Annual exam and abnormal bleeding.  HPI:  Shania Hammond is a 44 year old female P2, CD here for well woman exam and concerns of very heavy periods.  Hx of sterilization.  1 day prior to her period she develops a severe headache.  1-2 days of very heavy bleeding with gushing and bleeding through her clothes.  Egg and golf ball sized clots.  She denies pain or cramping.  This is then followed by 9-10 days of varied flow between spotting and heavy. Denies dyspareunia. Recent thyroid check with PCP with normal results. Last pap was negative 3/05/15.  Mammogram is up to date.  No LMP recorded.  Quit smoking 7 days ago. She states that she is not tolerant of hormonal contraceptives and does not wish to consider this as an option for management  Of heavy menses if workup warrants.   No other c/o.      Past GYN history:  No STD history  STI testing offered?  Declined       Last PAP smear:  Normal  Mammograms up to date: yes  Last TSH: 10/2019, normal?  Yes by pt report      Past Medical History:   Diagnosis Date     Abnormal weight gain     Resolved; Pineda Angel     Allergic rhinitis, cause unspecified 2011     Asthma      Depressive disorder, not elsewhere classified 2007     Diabetes (H)      Migraine      Tobacco abuse        Past Surgical History:   Procedure Laterality Date      SECTION  ,      DILATION AND CURETTAGE  ?     EGD with biopsy      Gastroesophageal reflux disease     GYN SURGERY  2013     , leep       Family History   Problem Relation Age of Onset     Allergies Sister      Cancer Mother      Diabetes Mother      Cancer Son         Neuroblastoma     C.A.D. Father          of MI at age 63     Attention Deficit Disorder Sister      Asthma Sister      Depression Sister      Hypertension Mother      Irritable Bowel Syndrome Mother      Learning Disorder Sister      Migraines Sister      Osteoporosis Mother        Current Outpatient Medications  "  Medication Sig Dispense Refill     cholecalciferol (VITAMIN D3) 5000 units (125 mcg) capsule TAKE 1 CAPSULE BY MOUTH ONCE DAILY  0     loratadine (CLARITIN) 10 MG tablet Take 10 mg by mouth daily       omeprazole (PRILOSEC) 40 MG capsule Take 1 capsule (40 mg) by mouth daily Take 30-60 minutes before a meal. 30 capsule 1     pantoprazole (PROTONIX) 20 MG EC tablet Take 40 mg by mouth daily       Thiamine HCl (VITAMIN B-1 PO)          Allergies: Morphine; Bactrim [sulfamethoxazole w-trimethoprim]; and Amoxicillin    ROS:  CONSTITUTIONAL:NEGATIVE for fever, chills, change in weight  BREAST: NEGATIVE for masses, tenderness or discharge  GI: NEGATIVE for nausea, abdominal pain, heartburn, or change in bowel habits  : negative for, dysparunia and dysuria    EXAM:  Blood pressure 118/68, pulse 80, height 1.638 m (5' 4.5\"), weight 90.7 kg (200 lb), SpO2 99 %, not currently breastfeeding.   BMI= Body mass index is 33.8 kg/m .  General - pleasant female in no acute distress.  Neck - supple without lymphadenopathy or thyromegaly.  Lungs - clear to auscultation bilaterally.  Heart - regular rate and rhythm without murmur.  Breast - no nodularity, asymmetry or nipple discharge bilaterally.  Abdomen - soft, nontender, nondistended, no hepatosplenomegaly.  Pelvic - EG: normal adult female, BUS: within normal limits, Vagina: well rugated, no discharge, Cervix: no lesions or CMT, Uterus: firm, normal sized and nontender, Adnexae: no masses POSITIVE right sided tenderness.  Rectovaginal - deferred.  Musculoskeletal - no gross deformities.  Neurological - normal strength, sensation, and mental status.      ASSESSMENT/PLAN:  (R10.2) Pelvic pain in female  (primary encounter diagnosis)  Comment:   Plan: CBC with platelets, US Pelvic Complete with         Transvaginal            (N92.0) Menorrhagia  Comment:   Plan: CBC with platelets, US Pelvic Complete with         Transvaginal  Will schedule for consult for possible D&C, " hysteroscopy, endometrial ablation or possible surgical management of abnormal bleeding as indicated by workup. She will obtain a pre op evaluation with her PCP prior to December 6,2019            (Z11.51) Screening for human papillomavirus  Comment:   Plan: A pap thin layer screen with  HPV - recommended        age 30 - 65 years (select HPV order below), HPV        High Risk Types DNA Cervical              Discussed exercise and healthy eating, including calcium intake.  She should return to the clinic in one year, or sooner if problems arise.    20 additional minutes were spent with this patient on evaluation and counseling

## 2019-11-12 ENCOUNTER — HOSPITAL ENCOUNTER (OUTPATIENT)
Dept: ULTRASOUND IMAGING | Facility: HOSPITAL | Age: 44
Discharge: HOME | End: 2019-11-12
Attending: NURSE PRACTITIONER | Admitting: NURSE PRACTITIONER
Payer: COMMERCIAL

## 2019-11-12 DIAGNOSIS — R10.2 PELVIC PAIN IN FEMALE: ICD-10-CM

## 2019-11-12 DIAGNOSIS — N92.0 MENORRHAGIA WITH REGULAR CYCLE: ICD-10-CM

## 2019-11-12 PROCEDURE — 76830 TRANSVAGINAL US NON-OB: CPT | Mod: TC

## 2019-11-13 ENCOUNTER — PREP FOR PROCEDURE (OUTPATIENT)
Dept: OBGYN | Facility: OTHER | Age: 44
End: 2019-11-13

## 2019-11-13 DIAGNOSIS — N83.201 CYST OF RIGHT OVARY: Primary | ICD-10-CM

## 2019-11-13 DIAGNOSIS — N93.9 ABNORMAL UTERINE BLEEDING: Primary | ICD-10-CM

## 2019-11-13 LAB
COPATH REPORT: NORMAL
PAP: NORMAL

## 2019-11-14 ENCOUNTER — OFFICE VISIT (OUTPATIENT)
Dept: OBGYN | Facility: OTHER | Age: 44
End: 2019-11-14
Attending: NURSE PRACTITIONER
Payer: COMMERCIAL

## 2019-11-14 VITALS
WEIGHT: 200 LBS | HEIGHT: 65 IN | DIASTOLIC BLOOD PRESSURE: 68 MMHG | SYSTOLIC BLOOD PRESSURE: 110 MMHG | BODY MASS INDEX: 33.32 KG/M2

## 2019-11-14 DIAGNOSIS — R93.89 THICKENED ENDOMETRIUM: ICD-10-CM

## 2019-11-14 DIAGNOSIS — Z11.3 SCREEN FOR STD (SEXUALLY TRANSMITTED DISEASE): Primary | ICD-10-CM

## 2019-11-14 DIAGNOSIS — R32 URINARY INCONTINENCE, UNSPECIFIED TYPE: ICD-10-CM

## 2019-11-14 LAB
C TRACH DNA SPEC QL PROBE+SIG AMP: NOT DETECTED
FINAL DIAGNOSIS: NORMAL
HPV HR 12 DNA CVX QL NAA+PROBE: NEGATIVE
HPV16 DNA SPEC QL NAA+PROBE: NEGATIVE
HPV18 DNA SPEC QL NAA+PROBE: NEGATIVE
N GONORRHOEA DNA SPEC QL PROBE+SIG AMP: NOT DETECTED
SPECIMEN DESCRIPTION: NORMAL
SPECIMEN SOURCE CVX/VAG CYTO: NORMAL
SPECIMEN SOURCE: NORMAL
SPECIMEN SOURCE: NORMAL
WET PREP SPEC: NORMAL

## 2019-11-14 PROCEDURE — 87086 URINE CULTURE/COLONY COUNT: CPT | Performed by: NURSE PRACTITIONER

## 2019-11-14 PROCEDURE — 99213 OFFICE O/P EST LOW 20 MIN: CPT | Mod: 25 | Performed by: NURSE PRACTITIONER

## 2019-11-14 PROCEDURE — 58100 BIOPSY OF UTERUS LINING: CPT | Performed by: NURSE PRACTITIONER

## 2019-11-14 PROCEDURE — 87491 CHLMYD TRACH DNA AMP PROBE: CPT | Performed by: NURSE PRACTITIONER

## 2019-11-14 PROCEDURE — 88305 TISSUE EXAM BY PATHOLOGIST: CPT | Mod: TC | Performed by: NURSE PRACTITIONER

## 2019-11-14 PROCEDURE — 87210 SMEAR WET MOUNT SALINE/INK: CPT | Performed by: NURSE PRACTITIONER

## 2019-11-14 PROCEDURE — 87591 N.GONORRHOEAE DNA AMP PROB: CPT | Performed by: NURSE PRACTITIONER

## 2019-11-14 ASSESSMENT — PAIN SCALES - GENERAL: PAINLEVEL: MILD PAIN (3)

## 2019-11-14 ASSESSMENT — MIFFLIN-ST. JEOR: SCORE: 1560.13

## 2019-11-14 NOTE — NURSING NOTE
"Chief Complaint   Patient presents with     EMB       Initial /68   Ht 1.638 m (5' 4.5\")   Wt 90.7 kg (200 lb)   BMI 33.80 kg/m   Estimated body mass index is 33.8 kg/m  as calculated from the following:    Height as of this encounter: 1.638 m (5' 4.5\").    Weight as of this encounter: 90.7 kg (200 lb).  Medication Reconciliation: complete  Yessica Rosenbaum LPN    "

## 2019-11-15 NOTE — PROGRESS NOTES
"[facility]                HPI   Shania presents for endometrial biopsy today.  See prior note and pelvic US.  She also notes a new development of clear watery vaginal discharge in large amounts.  She does not feel that it is urine. Denies color or odor.               Medications:     Current Outpatient Medications Ordered in Epic   Medication     cholecalciferol (VITAMIN D3) 5000 units (125 mcg) capsule     loratadine (CLARITIN) 10 MG tablet     omeprazole (PRILOSEC) 40 MG capsule     pantoprazole (PROTONIX) 20 MG EC tablet     Thiamine HCl (VITAMIN B-1 PO)     No current Epic-ordered facility-administered medications on file.                 Allergies:   Morphine; Bactrim [sulfamethoxazole w-trimethoprim]; and Amoxicillin         Review of Systems:   The 5 point Review of Systems is negative other than noted in the HPI                     Physical Exam:   Blood pressure 110/68, height 1.638 m (5' 4.5\"), weight 90.7 kg (200 lb), not currently breastfeeding.  Constitutional:   awake, alert, cooperative, no apparent distress, and appears stated age     Genitounirinary:   External Genitalia:  General appearance; normal, Lesions absent  Urethra:  Fullness absent, Masses absent  Vagina:  Lesions absent, watery discharge  Cervix:  Lesions absent, Discharge absent, Tenderness absent      PROCEDURE:  After informed consent was obtained from the patient, a speculum was placed in the vagina to visualize the cervix.  The cervix was then swabbed with a betadine prep.  Tenaculum was applied to the anterior cervical lip. Endometrial biopsy pipelle was passed through the cervical os and tissue obtained.  Uterus sounded to 8 cm.  Tenaculum was removed and sites were hemostatic. There were no complications. The patient tolerated the procedure well with a minimal amount of cramping noted.  Specimen was sent to pathology.        Data:     Results for orders placed or performed in visit on 11/14/19   Wet prep     " Status: None   Result Value Ref Range    Specimen Description Vagina     Wet Prep Rare  WBC'S seen       Wet Prep No Trichomonas seen     Wet Prep No clue cells seen     Wet Prep No yeast seen    GC/Chlamydia by PCR - [facility]     Status: None   Result Value Ref Range    Specimen Source Cervix     Neisseria gonorrhoreae PCR Not Detected NDET^Not Detected    Chlamydia Trachomatis PCR Not Detected NDET^Not Detected   Urine Culture Aerobic Bacterial     Status: None (Preliminary result)   Result Value Ref Range    Specimen Description Midstream Urine     Culture Micro Culture in progress                Assessment and Plan:   Endometrial biopsy - pathology pending.  Vaginal discharge - no sign of infection,  Urine culture in process.     Lillian Carrington NP, CFNP  11/15/2019  11:49 AM

## 2019-11-16 LAB
BACTERIA SPEC CULT: NORMAL
SPECIMEN SOURCE: NORMAL

## 2019-11-18 LAB — COPATH REPORT: NORMAL

## 2019-11-27 ENCOUNTER — OFFICE VISIT (OUTPATIENT)
Dept: SLEEP MEDICINE | Facility: HOSPITAL | Age: 44
End: 2019-11-27
Attending: FAMILY MEDICINE
Payer: COMMERCIAL

## 2019-11-27 DIAGNOSIS — R40.0 DAYTIME SLEEPINESS: Primary | ICD-10-CM

## 2019-11-27 PROCEDURE — 99207 ZZC NO CHARGE NURSE ONLY: CPT

## 2019-11-27 NOTE — NURSING NOTE
Patient picked up antigraphy and was instructed on the two week diary and instructions for over night sleep test and MSLT she had good understanding

## 2019-12-02 ENCOUNTER — TRANSFERRED RECORDS (OUTPATIENT)
Dept: HEALTH INFORMATION MANAGEMENT | Facility: HOSPITAL | Age: 44
End: 2019-12-02

## 2019-12-02 ENCOUNTER — ANESTHESIA EVENT (OUTPATIENT)
Dept: SURGERY | Facility: HOSPITAL | Age: 44
End: 2019-12-02
Payer: COMMERCIAL

## 2019-12-02 ASSESSMENT — LIFESTYLE VARIABLES: TOBACCO_USE: 1

## 2019-12-02 NOTE — ANESTHESIA PREPROCEDURE EVALUATION
Anesthesia Pre-Procedure Evaluation    Patient: Shania Hammond   MRN: [id number] : 1975          Preoperative Diagnosis: Abnormal uterine bleeding [N93.9]    Procedure(s):  HYSTEROSCOPY DILATION AND CURETTAGE AND ENDOMETRIAL ABLATION    Past Medical History:   Diagnosis Date     Abnormal weight gain     Resolved; Pineda Angel     Allergic rhinitis, cause unspecified 2011     Asthma      Depressive disorder, not elsewhere classified 2007     Diabetes (H)      Migraine      Tobacco abuse      Past Surgical History:   Procedure Laterality Date      SECTION  ,      DILATION AND CURETTAGE  ?     EGD with biopsy      Gastroesophageal reflux disease     GYN SURGERY       , leep       Anesthesia Evaluation     . Pt has had prior anesthetic. Type: Regional and MAC    No history of anesthetic complications          ROS/MED HX    ENT/Pulmonary:     (+)allergic rhinitis, tobacco use, Current use .025 packs/day  Intermittent asthma Treatment: Inhaler prn,  , . .    Neurologic:     (+)migraines,     Cardiovascular:  - neg cardiovascular ROS   (+) ----. : . . . :. . No previous cardiac testing       METS/Exercise Tolerance:  >4 METS   Hematologic:  - neg hematologic  ROS       Musculoskeletal:  - neg musculoskeletal ROS       GI/Hepatic:     (+) GERD Asymptomatic on medication,       Renal/Genitourinary:  - ROS Renal section negative       Endo:     (+) Obesity, .      Psychiatric:     (+) psychiatric history depression      Infectious Disease:  - neg infectious disease ROS       Malignancy:      - no malignancy   Other: Comment: Abnormal uterine bleeding                         Physical Exam  Normal systems: cardiovascular, pulmonary and dental    Airway   Mallampati: III  TM distance: >3 FB  Neck ROM: full    Dental     Cardiovascular   Rhythm and rate: regular and normal      Pulmonary    breath sounds clear to auscultation            Lab Results   Component Value  "Date    WBC 5.9 11/07/2019    HGB 12.9 11/07/2019    HCT 39.1 11/07/2019     11/07/2019     12/19/2016    POTASSIUM 4.4 12/19/2016    CHLORIDE 108 12/19/2016    CO2 25 12/19/2016    BUN 15 12/19/2016    CR 0.84 12/19/2016    GLC 81 12/19/2016    PARVEZ 8.5 12/19/2016    ALT 16 (L) 09/12/2019    AST 18 09/12/2019    TSH 2.24 03/13/2015    HCG Negative 12/19/2016       Preop Vitals  BP Readings from Last 3 Encounters:   11/14/19 110/68   11/07/19 118/68   09/10/19 124/76    Pulse Readings from Last 3 Encounters:   11/07/19 80   09/10/19 85   12/19/16 65      Resp Readings from Last 3 Encounters:   09/10/19 12   09/11/17 16   06/25/17 16    SpO2 Readings from Last 3 Encounters:   11/07/19 99%   09/10/19 99%   09/11/17 99%      Temp Readings from Last 1 Encounters:   09/11/17 98.3  F (36.8  C) (Tympanic)    Ht Readings from Last 1 Encounters:   11/14/19 1.638 m (5' 4.5\")      Wt Readings from Last 1 Encounters:   11/14/19 90.7 kg (200 lb)    Estimated body mass index is 33.8 kg/m  as calculated from the following:    Height as of 11/14/19: 1.638 m (5' 4.5\").    Weight as of 11/14/19: 90.7 kg (200 lb).       Anesthesia Plan      History & Physical Review  History and physical reviewed and following examination; no interval change.    ASA Status:  3 .    NPO Status:  > 8 hours    Plan for MAC with Intravenous and Propofol induction. Maintenance will be TIVA.  Reason for MAC:  Deep or markedly invasive procedure (G8)    Risks and benefits of MAC anesthetic discussed including dental damage, aspiration, loss of airway, conversion to general anesthetic, CV complications, MI, stroke, death. Pt wishes to proceed.       Postoperative Care  Postoperative pain management:  IV analgesics.      Consents  Anesthetic plan, risks, benefits and alternatives discussed with:  Patient..                 DANIEL Hernandez CRNA  "

## 2019-12-03 ENCOUNTER — OFFICE VISIT (OUTPATIENT)
Dept: OBGYN | Facility: OTHER | Age: 44
End: 2019-12-03
Attending: OBSTETRICS & GYNECOLOGY
Payer: COMMERCIAL

## 2019-12-03 VITALS
DIASTOLIC BLOOD PRESSURE: 74 MMHG | BODY MASS INDEX: 33.32 KG/M2 | SYSTOLIC BLOOD PRESSURE: 106 MMHG | HEIGHT: 65 IN | WEIGHT: 200 LBS

## 2019-12-03 DIAGNOSIS — N92.0 MENORRHAGIA WITH REGULAR CYCLE: Primary | ICD-10-CM

## 2019-12-03 PROCEDURE — 99214 OFFICE O/P EST MOD 30 MIN: CPT | Mod: 57 | Performed by: OBSTETRICS & GYNECOLOGY

## 2019-12-03 RX ORDER — ACETAMINOPHEN 325 MG/1
975 TABLET ORAL ONCE
Status: CANCELLED | OUTPATIENT
Start: 2019-12-03 | End: 2019-12-03

## 2019-12-03 RX ORDER — KETOROLAC TROMETHAMINE 30 MG/ML
30 INJECTION, SOLUTION INTRAMUSCULAR; INTRAVENOUS ONCE
Status: CANCELLED | OUTPATIENT
Start: 2019-12-03 | End: 2019-12-03

## 2019-12-03 RX ORDER — MISOPROSTOL 200 UG/1
200 TABLET ORAL ONCE
Status: CANCELLED | OUTPATIENT
Start: 2019-12-03 | End: 2019-12-03

## 2019-12-03 RX ORDER — CETIRIZINE HYDROCHLORIDE 10 MG/1
10 TABLET ORAL DAILY PRN
COMMUNITY
End: 2023-06-22

## 2019-12-03 ASSESSMENT — MIFFLIN-ST. JEOR: SCORE: 1560.13

## 2019-12-03 ASSESSMENT — PAIN SCALES - GENERAL: PAINLEVEL: NO PAIN (0)

## 2019-12-03 NOTE — PROGRESS NOTES
CC:  Consult from Lillian Galeas  for mennorhagia  HPI:  Shania Hammond is a 44 year old female P4 (2 vag, 2 CS). Patient's last menstrual period was 2019..  Menses are Regular lasting 6 days with 2 of heavy flow.  Her menstrual flow is limiting her clothing choices and interferes with lifestyle/activites. See prior w/u and evaluations by Lillian Carrington NP      Clots: Yes  Intermenstrual bleeding: No  Post-coital bleeding: No  Previous work-up:Yes, Unremakable US and embx  Contraception: BTO  Abnormal Pap: Yes, h/o LEEP  Dysmenorrhea: No  Pelvic pain:No  Dyspareunia: No    Past GYN history:        Last PAP smear:  Normal    Patients records are available and reviewed at today's visit.    Past Medical History:   Diagnosis Date     Abnormal weight gain     Resolved; Pineda Angel     Allergic rhinitis, cause unspecified 2011     Asthma      Depressive disorder, not elsewhere classified 2007     Diabetes (H)      Migraine      Tobacco abuse        Past Surgical History:   Procedure Laterality Date      SECTION  ,      DILATION AND CURETTAGE  ?     EGD with biopsy      Gastroesophageal reflux disease     GYN SURGERY       , leep       Family History   Problem Relation Age of Onset     Allergies Sister      Cancer Mother      Diabetes Mother      Hypertension Mother      Irritable Bowel Syndrome Mother      Osteoporosis Mother      Cancer Son         Neuroblastoma     C.A.D. Father          of MI at age 63     Attention Deficit Disorder Sister      Asthma Sister      Depression Sister      Learning Disorder Sister      Migraines Sister        Current Outpatient Medications   Medication Sig Dispense Refill     cholecalciferol (VITAMIN D3) 5000 units (125 mcg) capsule TAKE 1 CAPSULE BY MOUTH ONCE DAILY  0     loratadine (CLARITIN) 10 MG tablet Take 10 mg by mouth daily       omeprazole (PRILOSEC) 40 MG capsule Take 1 capsule (40 mg) by mouth daily Take 30-60  "minutes before a meal. 30 capsule 1     pantoprazole (PROTONIX) 20 MG EC tablet Take 40 mg by mouth daily       Thiamine HCl (VITAMIN B-1 PO)          Allergies: Morphine; Bactrim [sulfamethoxazole w-trimethoprim]; and Amoxicillin    ROS:  CONSTITUTIONAL: NEGATIVE for fever, chills, change in weight  GI: NEGATIVE for nausea, abdominal pain, heartburn, or change in bowel habits  : NEGATIVE for frequency, dysuria, hematuria, vaginal discharge  PSYCHIATRIC: NEGATIVE for changes in mood or affect    EXAM:  Blood pressure 106/74, height 1.638 m (5' 4.5\"), weight 90.7 kg (200 lb), last menstrual period 11/30/2019, not currently breastfeeding.   BMI= Body mass index is 33.8 kg/m .  General - pleasant female in no acute distress.  Rectovaginal - Not repeated        ASSESSMENT/PLAN:  Menorrhagia.  Discussed expectant,  medical, IUD and and surgical options(endometrial ablation/hysterectomy). Patient would like to proceed with Hysteroscopy/endometrial ablation. Reviewed goals, risks, alternatives for planned procedure.  Including risk of bleeding, infection, damage to nerves, blood vessels, bowel and bladder. Discussed recovery period and expected discomfort.. All questions were answered.  Failure rates and potential need for hysterectomy in the future discussed. Preoperative instructions discussed.  Pt desires to proceed. 25 minutes were spent with the patient with greater than 50% of the visit spent in face-to-face counseling and coordination of care.                    "

## 2019-12-03 NOTE — NURSING NOTE
"Chief Complaint   Patient presents with     Consult     consult/Charissa/hysteroscopy possible ablation       Initial /74 (BP Location: Left arm, Cuff Size: Adult Regular)   Ht 1.638 m (5' 4.5\")   Wt 90.7 kg (200 lb)   LMP 11/30/2019   BMI 33.80 kg/m   Estimated body mass index is 33.8 kg/m  as calculated from the following:    Height as of this encounter: 1.638 m (5' 4.5\").    Weight as of this encounter: 90.7 kg (200 lb).  Medication Reconciliation: complete  Jonna Ramos LPN  "

## 2019-12-06 ENCOUNTER — APPOINTMENT (OUTPATIENT)
Dept: LAB | Facility: HOSPITAL | Age: 44
End: 2019-12-06
Attending: OBSTETRICS & GYNECOLOGY
Payer: COMMERCIAL

## 2019-12-06 ENCOUNTER — ANESTHESIA (OUTPATIENT)
Dept: SURGERY | Facility: HOSPITAL | Age: 44
End: 2019-12-06
Payer: COMMERCIAL

## 2019-12-06 ENCOUNTER — HOSPITAL ENCOUNTER (OUTPATIENT)
Facility: HOSPITAL | Age: 44
Discharge: HOME | End: 2019-12-06
Attending: OBSTETRICS & GYNECOLOGY | Admitting: OBSTETRICS & GYNECOLOGY
Payer: COMMERCIAL

## 2019-12-06 VITALS
WEIGHT: 200 LBS | SYSTOLIC BLOOD PRESSURE: 108 MMHG | TEMPERATURE: 97.5 F | OXYGEN SATURATION: 99 % | BODY MASS INDEX: 33.32 KG/M2 | HEIGHT: 65 IN | HEART RATE: 50 BPM | DIASTOLIC BLOOD PRESSURE: 75 MMHG | RESPIRATION RATE: 16 BRPM

## 2019-12-06 DIAGNOSIS — N93.9 ABNORMAL UTERINE BLEEDING: ICD-10-CM

## 2019-12-06 DIAGNOSIS — Z98.890 POST-OPERATIVE STATE: Primary | ICD-10-CM

## 2019-12-06 LAB — HCG UR QL: NEGATIVE

## 2019-12-06 PROCEDURE — 36000056 ZZH SURGERY LEVEL 3 1ST 30 MIN: Performed by: OBSTETRICS & GYNECOLOGY

## 2019-12-06 PROCEDURE — 37000008 ZZH ANESTHESIA TECHNICAL FEE, 1ST 30 MIN: Performed by: OBSTETRICS & GYNECOLOGY

## 2019-12-06 PROCEDURE — 25000132 ZZH RX MED GY IP 250 OP 250 PS 637: Performed by: OBSTETRICS & GYNECOLOGY

## 2019-12-06 PROCEDURE — 81025 URINE PREGNANCY TEST: CPT | Performed by: OBSTETRICS & GYNECOLOGY

## 2019-12-06 PROCEDURE — 40000305 ZZH STATISTIC PRE PROC ASSESS I: Performed by: OBSTETRICS & GYNECOLOGY

## 2019-12-06 PROCEDURE — 25000128 H RX IP 250 OP 636: Performed by: OBSTETRICS & GYNECOLOGY

## 2019-12-06 PROCEDURE — 25000128 H RX IP 250 OP 636: Performed by: NURSE ANESTHETIST, CERTIFIED REGISTERED

## 2019-12-06 PROCEDURE — 71000027 ZZH RECOVERY PHASE 2 EACH 15 MINS: Performed by: OBSTETRICS & GYNECOLOGY

## 2019-12-06 PROCEDURE — 25000125 ZZHC RX 250: Performed by: NURSE ANESTHETIST, CERTIFIED REGISTERED

## 2019-12-06 PROCEDURE — 27210794 ZZH OR GENERAL SUPPLY STERILE: Performed by: OBSTETRICS & GYNECOLOGY

## 2019-12-06 PROCEDURE — 25800030 ZZH RX IP 258 OP 636: Performed by: NURSE ANESTHETIST, CERTIFIED REGISTERED

## 2019-12-06 PROCEDURE — 25000125 ZZHC RX 250: Performed by: OBSTETRICS & GYNECOLOGY

## 2019-12-06 PROCEDURE — 37000009 ZZH ANESTHESIA TECHNICAL FEE, EACH ADDTL 15 MIN: Performed by: OBSTETRICS & GYNECOLOGY

## 2019-12-06 PROCEDURE — 58563 HYSTEROSCOPY ABLATION: CPT | Performed by: OBSTETRICS & GYNECOLOGY

## 2019-12-06 PROCEDURE — 36000058 ZZH SURGERY LEVEL 3 EA 15 ADDTL MIN: Performed by: OBSTETRICS & GYNECOLOGY

## 2019-12-06 PROCEDURE — 58563 HYSTEROSCOPY ABLATION: CPT | Performed by: NURSE ANESTHETIST, CERTIFIED REGISTERED

## 2019-12-06 RX ORDER — DEXAMETHASONE SODIUM PHOSPHATE 4 MG/ML
4 INJECTION, SOLUTION INTRA-ARTICULAR; INTRALESIONAL; INTRAMUSCULAR; INTRAVENOUS; SOFT TISSUE EVERY 10 MIN PRN
Status: DISCONTINUED | OUTPATIENT
Start: 2019-12-06 | End: 2019-12-06 | Stop reason: HOSPADM

## 2019-12-06 RX ORDER — NALOXONE HYDROCHLORIDE 0.4 MG/ML
.1-.4 INJECTION, SOLUTION INTRAMUSCULAR; INTRAVENOUS; SUBCUTANEOUS
Status: DISCONTINUED | OUTPATIENT
Start: 2019-12-06 | End: 2019-12-06 | Stop reason: HOSPADM

## 2019-12-06 RX ORDER — SODIUM CHLORIDE, SODIUM LACTATE, POTASSIUM CHLORIDE, CALCIUM CHLORIDE 600; 310; 30; 20 MG/100ML; MG/100ML; MG/100ML; MG/100ML
INJECTION, SOLUTION INTRAVENOUS CONTINUOUS
Status: DISCONTINUED | OUTPATIENT
Start: 2019-12-06 | End: 2019-12-06 | Stop reason: HOSPADM

## 2019-12-06 RX ORDER — ONDANSETRON 4 MG/1
4 TABLET, ORALLY DISINTEGRATING ORAL EVERY 30 MIN PRN
Status: COMPLETED | OUTPATIENT
Start: 2019-12-06 | End: 2019-12-06

## 2019-12-06 RX ORDER — FENTANYL CITRATE 50 UG/ML
25-50 INJECTION, SOLUTION INTRAMUSCULAR; INTRAVENOUS
Status: DISCONTINUED | OUTPATIENT
Start: 2019-12-06 | End: 2019-12-06 | Stop reason: HOSPADM

## 2019-12-06 RX ORDER — ONDANSETRON 4 MG/1
4 TABLET, ORALLY DISINTEGRATING ORAL
Status: DISCONTINUED | OUTPATIENT
Start: 2019-12-06 | End: 2019-12-06 | Stop reason: HOSPADM

## 2019-12-06 RX ORDER — LIDOCAINE HYDROCHLORIDE 10 MG/ML
INJECTION, SOLUTION INFILTRATION; PERINEURAL PRN
Status: DISCONTINUED | OUTPATIENT
Start: 2019-12-06 | End: 2019-12-06 | Stop reason: HOSPADM

## 2019-12-06 RX ORDER — ONDANSETRON 4 MG/1
4 TABLET, ORALLY DISINTEGRATING ORAL EVERY 6 HOURS PRN
Qty: 5 TABLET | Refills: 0 | Status: SHIPPED | OUTPATIENT
Start: 2019-12-06 | End: 2022-01-19

## 2019-12-06 RX ORDER — KETOROLAC TROMETHAMINE 30 MG/ML
30 INJECTION, SOLUTION INTRAMUSCULAR; INTRAVENOUS ONCE
Status: COMPLETED | OUTPATIENT
Start: 2019-12-06 | End: 2019-12-06

## 2019-12-06 RX ORDER — HYDROCODONE BITARTRATE AND ACETAMINOPHEN 5; 325 MG/1; MG/1
1 TABLET ORAL
Status: COMPLETED | OUTPATIENT
Start: 2019-12-06 | End: 2019-12-06

## 2019-12-06 RX ORDER — ACETAMINOPHEN 325 MG/1
975 TABLET ORAL ONCE
Status: COMPLETED | OUTPATIENT
Start: 2019-12-06 | End: 2019-12-06

## 2019-12-06 RX ORDER — LIDOCAINE HYDROCHLORIDE 20 MG/ML
INJECTION, SOLUTION INFILTRATION; PERINEURAL PRN
Status: DISCONTINUED | OUTPATIENT
Start: 2019-12-06 | End: 2019-12-06

## 2019-12-06 RX ORDER — MEPERIDINE HYDROCHLORIDE 50 MG/ML
12.5 INJECTION INTRAMUSCULAR; INTRAVENOUS; SUBCUTANEOUS
Status: DISCONTINUED | OUTPATIENT
Start: 2019-12-06 | End: 2019-12-06 | Stop reason: HOSPADM

## 2019-12-06 RX ORDER — MISOPROSTOL 200 UG/1
200 TABLET ORAL ONCE
Status: COMPLETED | OUTPATIENT
Start: 2019-12-06 | End: 2019-12-06

## 2019-12-06 RX ORDER — IBUPROFEN 800 MG/1
800 TABLET, FILM COATED ORAL EVERY 8 HOURS PRN
Qty: 30 TABLET | Refills: 1 | Status: SHIPPED | OUTPATIENT
Start: 2019-12-06

## 2019-12-06 RX ORDER — KETAMINE HYDROCHLORIDE 10 MG/ML
INJECTION INTRAMUSCULAR; INTRAVENOUS PRN
Status: DISCONTINUED | OUTPATIENT
Start: 2019-12-06 | End: 2019-12-06

## 2019-12-06 RX ORDER — HYDROCODONE BITARTRATE AND ACETAMINOPHEN 5; 325 MG/1; MG/1
1-2 TABLET ORAL EVERY 4 HOURS PRN
Qty: 10 TABLET | Refills: 0 | Status: SHIPPED | OUTPATIENT
Start: 2019-12-06 | End: 2022-01-19

## 2019-12-06 RX ORDER — HYDROXYZINE HYDROCHLORIDE 25 MG/1
50 TABLET, FILM COATED ORAL
Status: DISCONTINUED | OUTPATIENT
Start: 2019-12-06 | End: 2019-12-06 | Stop reason: HOSPADM

## 2019-12-06 RX ORDER — ONDANSETRON 2 MG/ML
4 INJECTION INTRAMUSCULAR; INTRAVENOUS EVERY 30 MIN PRN
Status: COMPLETED | OUTPATIENT
Start: 2019-12-06 | End: 2019-12-06

## 2019-12-06 RX ORDER — PROPOFOL 10 MG/ML
INJECTION, EMULSION INTRAVENOUS PRN
Status: DISCONTINUED | OUTPATIENT
Start: 2019-12-06 | End: 2019-12-06

## 2019-12-06 RX ORDER — LIDOCAINE 40 MG/G
CREAM TOPICAL
Status: DISCONTINUED | OUTPATIENT
Start: 2019-12-06 | End: 2019-12-06 | Stop reason: HOSPADM

## 2019-12-06 RX ADMIN — KETOROLAC TROMETHAMINE 30 MG: 30 INJECTION, SOLUTION INTRAMUSCULAR at 07:52

## 2019-12-06 RX ADMIN — HYDROCODONE BITARTRATE AND ACETAMINOPHEN 1 TABLET: 5; 325 TABLET ORAL at 09:44

## 2019-12-06 RX ADMIN — PROPOFOL 20 MG: 10 INJECTION, EMULSION INTRAVENOUS at 08:30

## 2019-12-06 RX ADMIN — PROPOFOL 50 MG: 10 INJECTION, EMULSION INTRAVENOUS at 08:17

## 2019-12-06 RX ADMIN — PROPOFOL 50 MG: 10 INJECTION, EMULSION INTRAVENOUS at 08:22

## 2019-12-06 RX ADMIN — MIDAZOLAM 2 MG: 1 INJECTION INTRAMUSCULAR; INTRAVENOUS at 08:04

## 2019-12-06 RX ADMIN — PROPOFOL 50 MG: 10 INJECTION, EMULSION INTRAVENOUS at 08:27

## 2019-12-06 RX ADMIN — LIDOCAINE HYDROCHLORIDE 40 MG: 20 INJECTION, SOLUTION INFILTRATION; PERINEURAL at 08:11

## 2019-12-06 RX ADMIN — ONDANSETRON 4 MG: 2 INJECTION INTRAMUSCULAR; INTRAVENOUS at 09:54

## 2019-12-06 RX ADMIN — ACETAMINOPHEN 975 MG: 325 TABLET, FILM COATED ORAL at 07:52

## 2019-12-06 RX ADMIN — MISOPROSTOL 200 MCG: 200 TABLET ORAL at 07:53

## 2019-12-06 RX ADMIN — SODIUM CHLORIDE, POTASSIUM CHLORIDE, SODIUM LACTATE AND CALCIUM CHLORIDE: 600; 310; 30; 20 INJECTION, SOLUTION INTRAVENOUS at 07:52

## 2019-12-06 RX ADMIN — PROPOFOL 50 MG: 10 INJECTION, EMULSION INTRAVENOUS at 08:11

## 2019-12-06 RX ADMIN — FENTANYL CITRATE 50 MCG: 50 INJECTION, SOLUTION INTRAMUSCULAR; INTRAVENOUS at 10:09

## 2019-12-06 RX ADMIN — KETAMINE HYDROCHLORIDE 30 MG: 10 INJECTION, SOLUTION INTRAMUSCULAR; INTRAVENOUS at 08:10

## 2019-12-06 RX ADMIN — ONDANSETRON 4 MG: 4 TABLET, ORALLY DISINTEGRATING ORAL at 09:45

## 2019-12-06 ASSESSMENT — MIFFLIN-ST. JEOR: SCORE: 1560.13

## 2019-12-06 NOTE — OR NURSING
Pt states she feel like she needs to have a bowel movement, up to bathroom, tolerates walking. tolerting PO, states pain related to procedure 2/10.

## 2019-12-06 NOTE — DISCHARGE INSTRUCTIONS
Post-Anesthesia Patient Instructions    IMMEDIATELY FOLLOWING SURGERY:  Do not drive or operate machinery for the first twenty four hours after surgery.  Do not make any important decisions for twenty four hours after surgery or while taking narcotic pain medications or sedatives.  If you develop intractable nausea and vomiting or a severe headache please notify your doctor immediately.    FOLLOW-UP:  Please make an appointment with your surgeon as instructed. You do not need to follow up with anesthesia unless specifically instructed to do so.    WOUND CARE INSTRUCTIONS (if applicable):  Keep a dry clean dressing on the anesthesia/puncture wound site if there is drainage.  Once the wound has quit draining you may leave it open to air.  Generally you should leave the bandage intact for twenty four hours unless there is drainage.  If the epidural site drains for more than 36-48 hours please call the anesthesia department.    QUESTIONS?:  Please feel free to call your physician or the hospital  if you have any questions, and they will be happy to assist you.   Call MD prior to DC.    No driving today or while on pain meds  Pelvic rest for 2 weeks  Call Dr. Carrizales [phone] as necessary if problems in interim, no post op appt needed.

## 2019-12-06 NOTE — ANESTHESIA CARE TRANSFER NOTE
Patient: Shania Hammond    Procedure(s):  HYSTEROSCOPY DILATION AND CURETTAGE AND ENDOMETRIAL ABLATION    Diagnosis: Abnormal uterine bleeding [N93.9]  Diagnosis Additional Information: No value filed.    Anesthesia Type:   MAC     Note:  Airway :Nasal Cannula  Patient transferred to:Phase II  Handoff Report: Identifed the Patient, Identified the Reponsible Provider, Reviewed the pertinent medical history, Discussed the surgical course, Reviewed Intra-OP anesthesia mangement and issues during anesthesia, Set expectations for post-procedure period and Allowed opportunity for questions and acknowledgement of understanding      Vitals: (Last set prior to Anesthesia Care Transfer)    CRNA VITALS  12/6/2019 0803 - 12/6/2019 0837      12/6/2019             Pulse:  70    SpO2:  98 %    Resp Rate (set):  8                Electronically Signed By: DANIEL Rebolledo CRNA  December 6, 2019  8:37 AM

## 2019-12-06 NOTE — OR NURSING
The patient is reporting improvement in her lower abdominal pressure and cramping. States her nausea has resolved.  at the bedside.

## 2019-12-06 NOTE — OR NURSING
The patient denies lightheadedness while up and states she feels ready for discharge. No further nausea. Discharge instructions given to the patient and  with verbal understanding stated.

## 2019-12-06 NOTE — INTERVAL H&P NOTE
History and physical reviewed on 12/6/2019.  Patient examined. No interval change in condition.   Consent form reviewed with patient and signed.  All questions answered.  Pt desires to proceed.      Hussein Carrizales MD  7:56 AM

## 2019-12-06 NOTE — OR NURSING
The patient is reporting abdominal cramping due to feeling like she needs to pass a bowel movement and states some nausea. She reports from trying to pass a BM she is now having procedural pain from sitting on the toilet so long. Passed large watery emesis from drinking two glasses of water while on the toilet. She vomited up what looked like pain pill particles.

## 2019-12-06 NOTE — OR NURSING
The patient's IV in the right hand was infiltrated and restarted prior to giving the Fentanyl medication.

## 2019-12-06 NOTE — OR NURSING
The patient is sitting upright and was able to eat a yao cracker and drink some water. States she is more comfortable at this time, denies nausea.

## 2019-12-06 NOTE — OP NOTE
[facility] Operative Note:    PREOPERATIVE DIAGNOSIS: Menorrhagia.   POSTOPERATIVE DIAGNOSIS: Menorrhagia.   PROCEDURE: Hysteroscopy, endometrial ablation (NovaSure).   ANESTHESIA: IV sedation, paracervical block.   ESTIMATED BLOOD LOSS: Minimal.   SPECIMENS: None.   OPERATIVE FINDINGS: The uterus sounded to 9 cm. Normal hysteroscopy with no evidence of submucosal fibroids or polyps and benign-appearing endometrium. Well-ablated endometrial cavity postprocedure. No evidence of perforation.  Well ablated endometrial cavity postprocedure. No evidence of perforation.   DESCRIPTION OF PROCEDURE: The patient was brought to the operating room and IV sedation was given. She was prepped and draped in the dorsal lithotomy position and her bladder drained. A weighted speculum was placed. The cervix was grasped anteriorly with a fine-tooth tenaculum and the uterus sounded. The cervix was gently dilated with Hegar dilators. Hysteroscopy was performed using a 30-degree rigid hysteroscope and normal saline distention media. Visualization was excellent. Findings were as described above.  The hysteroscope was removed.  The NovaSure was then gently inserted into the uterine fundus and deployed. Cavitary measurements were obtained and cervical collar advanced. A test cycle completed successfully. The energy cycle was then begun and completed successfully after 107 seconds. The NovaSure was withdrawn. Hysteroscopy was reperformed showing well ablated endometrial cavity. The instruments were removed. There were no complications. The patient was transferred to the recovery room in stable condition.   MILES BELTRÁN MD

## 2019-12-06 NOTE — ANESTHESIA POSTPROCEDURE EVALUATION
Patient: Shania Hammond    Procedure(s):  HYSTEROSCOPY DILATION AND CURETTAGE AND ENDOMETRIAL ABLATION    Diagnosis:Abnormal uterine bleeding [N93.9]  Diagnosis Additional Information: No value filed.    Anesthesia Type:  MAC    Note:  Anesthesia Post Evaluation    Patient location during evaluation: Bedside  Patient participation: Able to fully participate in evaluation  Level of consciousness: awake and alert  Pain management: adequate  Airway patency: patent  Cardiovascular status: acceptable  Respiratory status: acceptable  Hydration status: acceptable  PONV: none     Anesthetic complications: None          Last vitals:  Vitals:    12/06/19 0855 12/06/19 0900 12/06/19 0905   BP: 104/51 100/83    Pulse: 60 65    Resp: 16 16    Temp:      SpO2: 100% 100% 99%         Electronically Signed By: DANIEL Rebolledo CRNA  December 6, 2019  9:42 AM

## 2019-12-06 NOTE — OR NURSING
"The patient continues to sit on the toilet in the bathroom. She states she passed two \"jerome\" of stool and feels like she will have diarrhea soon. She denies dizziness or lightheadedness. Taking her second glass of water while sitting on the toilet. She states, \"I really have to do this before I can go home or I will have a diarrhea accident.\" States pain from the procedure is minimal and no increase in vaginal drainage noted.  "

## 2019-12-23 ENCOUNTER — TELEPHONE (OUTPATIENT)
Dept: SLEEP MEDICINE | Facility: HOSPITAL | Age: 44
End: 2019-12-23

## 2019-12-23 ENCOUNTER — OFFICE VISIT (OUTPATIENT)
Dept: SLEEP MEDICINE | Facility: HOSPITAL | Age: 44
End: 2019-12-23
Attending: FAMILY MEDICINE
Payer: COMMERCIAL

## 2019-12-23 ENCOUNTER — HOSPITAL ENCOUNTER (OUTPATIENT)
Dept: ULTRASOUND IMAGING | Facility: HOSPITAL | Age: 44
Discharge: HOME | End: 2019-12-23
Attending: NURSE PRACTITIONER | Admitting: NURSE PRACTITIONER
Payer: COMMERCIAL

## 2019-12-23 DIAGNOSIS — R06.81 APNEA: Primary | ICD-10-CM

## 2019-12-23 DIAGNOSIS — N83.201 CYST OF RIGHT OVARY: ICD-10-CM

## 2019-12-23 PROCEDURE — 76830 TRANSVAGINAL US NON-OB: CPT | Mod: TC

## 2019-12-26 ENCOUNTER — DOCUMENTATION ONLY (OUTPATIENT)
Dept: SLEEP MEDICINE | Facility: HOSPITAL | Age: 44
End: 2019-12-26
Attending: FAMILY MEDICINE
Payer: COMMERCIAL

## 2019-12-26 PROCEDURE — G0399 HOME SLEEP TEST/TYPE 3 PORTA: HCPCS

## 2019-12-26 PROCEDURE — G0399 HOME SLEEP TEST/TYPE 3 PORTA: HCPCS | Mod: 26 | Performed by: FAMILY MEDICINE

## 2019-12-27 NOTE — PROGRESS NOTES
This HSAT was performed using a Noxturnal T3 device which recorded snore, sound, movement activity, body position, nasal pressure, oronasal thermal airflow, pulse, oximetry and both chest and abdominal respiratory effort. HSAT data was restricted to the time patient states they were in bed.     HSAT was scored using 1B 4% hypopnea rule.     HST AHI (Non-PAT): 1.5  Snoring was reported as moderate.  Time with SpO2 below 89% was 0 minutes.   Overall signal quality was fair     Pt will follow up with sleep provider to determine appropriate therapy.   Patient did not return the post test questionnaire   Patient was originally scheduled for antigraphy it failed we were going to the test and MSLT however insurance denied this testing so we went ahead and did the HST.

## 2019-12-31 NOTE — PROCEDURES
"HOME SLEEP STUDY INTERPRETATION    Patient: Shania Hammond  MRN: [id number]  YOB: 1975  Study Date: 2019  Referring Provider: Haile Mckay  Ordering Provider: Willian Valera MD     Indications for Home Study: Shania Hammond is a 44 year old female with a history of migraines, GERD, gestational diabetes, depression, chronic venous insufficiency in R leg who presents with symptoms suggestive of obstructive sleep apnea.    Estimated body mass index is 33.8 kg/m  as calculated from the following:    Height as of 19: 1.638 m (5' 4.5\").    Weight as of 19: 90.7 kg (200 lb).  [place] Sleepiness Scale: 10/24  STOP-BAN/8    Data: A full night home sleep study was performed recording the standard physiologic parameters including body position, movement, sound, nasal pressure, thermal oral airflow, chest and abdominal movements with respiratory inductance plethysmography, and oxygen saturation by pulse oximetry. Pulse rate was estimated by oximetry recording. This study was considered adequate based on > 4 hours of quality oximetry and respiratory recording. As specified by the AASM Manual for the Scoring of Sleep and Associated events, version 2.3, Rule VIII.D 1B, 4% oxygen desaturation scoring for hypopneas is used as a standard of care on all home sleep apnea testing.    Analysis Time:  558.4 minutes    Respiration:   Sleep Associated Hypoxemia: sustained hypoxemia was not present. Baseline oxygen saturation was 95%.  Time with saturation less than or equal to 88% was 0 minutes. The lowest oxygen saturation was 90%.   Snoring: Snoring was present.  Respiratory events: The home study revealed a presence of 11 obstructive apneas and 1 mixed and central apneas. There were 2 hypopneas resulting in a combined apnea/hypopnea index [AHI] of 1.5 events per hour.  AHI was 1.7 per hour supine, 0 per hour prone, 3.2 per hour on left side, and 0 per hour on right side.   Pattern: Excluding events " noted above, respiratory rate and pattern was Normal.    Position: Percent of time spent: supine - 71.7%, prone - 3.9%, on left - 7.2%, on right - 15.2%.    Heart Rate: By pulse oximetry normal rate was noted.     Assessment:   - No significant obstructive sleep apnea observed  - Sleep associated hypoxemia was not present.    Recommendations:  Consider in-lab PSG if high clinical concern for sleep-disordered breathing.  Suggest optimizing sleep hygiene and avoiding sleep deprivation.  Weight management.    Diagnosis Code(s): Snoring R06.83    Willian Valera MD, MD, December 31, 2019   Diplomate, American Board of Family Medicine, Sleep Medicine

## 2020-01-14 ENCOUNTER — TELEPHONE (OUTPATIENT)
Dept: SLEEP MEDICINE | Facility: CLINIC | Age: 45
End: 2020-01-14

## 2020-01-14 DIAGNOSIS — F32.A FATIGUE DUE TO DEPRESSION: Primary | ICD-10-CM

## 2020-01-14 DIAGNOSIS — R53.83 FATIGUE DUE TO DEPRESSION: Primary | ICD-10-CM

## 2020-01-14 DIAGNOSIS — G47.11 IDIOPATHIC HYPERSOMNIA: ICD-10-CM

## 2020-01-14 RX ORDER — MODAFINIL 200 MG/1
TABLET ORAL
Qty: 30 TABLET | Refills: 5 | Status: SHIPPED | OUTPATIENT
Start: 2020-01-14 | End: 2023-06-22

## 2020-01-14 NOTE — TELEPHONE ENCOUNTER
Called and reviewed HST results with patient.  Plan for actigraphy with PSG and MSLT, but actigraphy failed and difficulties with insurance prior authorization, so we ended up with HST to rule out MISHA.    HST performed 12/23/2019 with analysis time 558.4 minutes, AHI 1.5, baseline SpO2 95%, kimberly SpO2 90%.    Given her clinical history, no evidence of MISHA, significant symptoms, and co-morbid depression, agreed to start trial of modafinil 100-200mg QAM or 100mg PO BID.    Current diagnosis of chronic fatigue with co-morbid depression, idiopathic hypersomnia without long sleep time.

## 2020-02-16 ENCOUNTER — HEALTH MAINTENANCE LETTER (OUTPATIENT)
Age: 45
End: 2020-02-16

## 2020-02-19 ENCOUNTER — HOSPITAL ENCOUNTER (EMERGENCY)
Facility: HOSPITAL | Age: 45
Discharge: HOME | End: 2020-02-19
Attending: NURSE PRACTITIONER | Admitting: NURSE PRACTITIONER
Payer: COMMERCIAL

## 2020-02-19 VITALS
SYSTOLIC BLOOD PRESSURE: 127 MMHG | TEMPERATURE: 97.3 F | HEART RATE: 95 BPM | DIASTOLIC BLOOD PRESSURE: 86 MMHG | RESPIRATION RATE: 17 BRPM | OXYGEN SATURATION: 100 %

## 2020-02-19 DIAGNOSIS — Z00.00 NORMAL EXAM: ICD-10-CM

## 2020-02-19 LAB
ALBUMIN SERPL-MCNC: 3.5 G/DL (ref 3.4–5)
ALP SERPL-CCNC: 69 U/L (ref 40–150)
ALT SERPL W P-5'-P-CCNC: 35 U/L (ref 0–50)
ANION GAP SERPL CALCULATED.3IONS-SCNC: 4 MMOL/L (ref 3–14)
AST SERPL W P-5'-P-CCNC: 24 U/L (ref 0–45)
BASOPHILS # BLD AUTO: 0 10E9/L (ref 0–0.2)
BASOPHILS NFR BLD AUTO: 0.6 %
BILIRUB SERPL-MCNC: 0.4 MG/DL (ref 0.2–1.3)
BUN SERPL-MCNC: 13 MG/DL (ref 7–30)
CALCIUM SERPL-MCNC: 8.7 MG/DL (ref 8.5–10.1)
CHLORIDE SERPL-SCNC: 108 MMOL/L (ref 94–109)
CO2 SERPL-SCNC: 26 MMOL/L (ref 20–32)
CREAT SERPL-MCNC: 0.8 MG/DL (ref 0.52–1.04)
DIFFERENTIAL METHOD BLD: NORMAL
EOSINOPHIL # BLD AUTO: 0.1 10E9/L (ref 0–0.7)
EOSINOPHIL NFR BLD AUTO: 1 %
ERYTHROCYTE [DISTWIDTH] IN BLOOD BY AUTOMATED COUNT: 14.6 % (ref 10–15)
EST. AVERAGE GLUCOSE BLD GHB EST-MCNC: 100 MG/DL
GFR SERPL CREATININE-BSD FRML MDRD: >90 ML/MIN/{1.73_M2}
GLUCOSE BLDC GLUCOMTR-MCNC: 80 MG/DL (ref 70–99)
GLUCOSE SERPL-MCNC: 91 MG/DL (ref 70–99)
HBA1C MFR BLD: 5.1 % (ref 0–5.6)
HCT VFR BLD AUTO: 43.2 % (ref 35–47)
HGB BLD-MCNC: 14 G/DL (ref 11.7–15.7)
IMM GRANULOCYTES # BLD: 0 10E9/L (ref 0–0.4)
IMM GRANULOCYTES NFR BLD: 0.2 %
LYMPHOCYTES # BLD AUTO: 2 10E9/L (ref 0.8–5.3)
LYMPHOCYTES NFR BLD AUTO: 31.2 %
MCH RBC QN AUTO: 29.4 PG (ref 26.5–33)
MCHC RBC AUTO-ENTMCNC: 32.4 G/DL (ref 31.5–36.5)
MCV RBC AUTO: 91 FL (ref 78–100)
MONOCYTES # BLD AUTO: 0.6 10E9/L (ref 0–1.3)
MONOCYTES NFR BLD AUTO: 9.4 %
NEUTROPHILS # BLD AUTO: 3.6 10E9/L (ref 1.6–8.3)
NEUTROPHILS NFR BLD AUTO: 57.6 %
NRBC # BLD AUTO: 0 10*3/UL
NRBC BLD AUTO-RTO: 0 /100
PLATELET # BLD AUTO: 245 10E9/L (ref 150–450)
POTASSIUM SERPL-SCNC: 4.4 MMOL/L (ref 3.4–5.3)
PROT SERPL-MCNC: 7.3 G/DL (ref 6.8–8.8)
RBC # BLD AUTO: 4.77 10E12/L (ref 3.8–5.2)
SODIUM SERPL-SCNC: 138 MMOL/L (ref 133–144)
WBC # BLD AUTO: 6.3 10E9/L (ref 4–11)

## 2020-02-19 PROCEDURE — 40000788 ZZHCL STATISTIC ESTIMATED AVERAGE GLUCOSE: Performed by: NURSE PRACTITIONER

## 2020-02-19 PROCEDURE — 99213 OFFICE O/P EST LOW 20 MIN: CPT | Mod: Z6 | Performed by: NURSE PRACTITIONER

## 2020-02-19 PROCEDURE — 36415 COLL VENOUS BLD VENIPUNCTURE: CPT | Performed by: NURSE PRACTITIONER

## 2020-02-19 PROCEDURE — 80053 COMPREHEN METABOLIC PANEL: CPT | Performed by: NURSE PRACTITIONER

## 2020-02-19 PROCEDURE — 83036 HEMOGLOBIN GLYCOSYLATED A1C: CPT | Performed by: NURSE PRACTITIONER

## 2020-02-19 PROCEDURE — G0463 HOSPITAL OUTPT CLINIC VISIT: HCPCS

## 2020-02-19 PROCEDURE — 85025 COMPLETE CBC W/AUTO DIFF WBC: CPT | Performed by: NURSE PRACTITIONER

## 2020-02-19 PROCEDURE — 00000146 ZZHCL STATISTIC GLUCOSE BY METER IP

## 2020-02-19 ASSESSMENT — ENCOUNTER SYMPTOMS
HEADACHES: 1
SINUS PAIN: 0
ACTIVITY CHANGE: 1
VOMITING: 0
PHOTOPHOBIA: 1
SINUS PRESSURE: 0
DIZZINESS: 0
CHILLS: 1
LIGHT-HEADEDNESS: 0
DIARRHEA: 0
AGITATION: 1
FATIGUE: 1
NAUSEA: 0
FEVER: 0

## 2020-02-19 NOTE — ED AVS SNAPSHOT
[facility]  [address] [address]  Phone:  [phone]                                    Shania Hammond   MRN: [id number]    Department:  [facility]   Date of Visit:  2/19/2020           After Visit Summary Signature Page    I have received my discharge instructions, and my questions have been answered. I have discussed any challenges I see with this plan with the nurse or doctor.    ..........................................................................................................................................  Patient/Patient Representative Signature      ..........................................................................................................................................  Patient Representative Print Name and Relationship to Patient    ..................................................               ................................................  Date                                   Time    ..........................................................................................................................................  Reviewed by Signature/Title    ...................................................              ..............................................  Date                                               Time          22EPIC Rev 08/18

## 2020-02-19 NOTE — ED TRIAGE NOTES
Pt here with c/o wanting a Blood Sugar check. Pt report she gets mild headache and raphael zepeda when she eats. Pt reports after she eats dinner she gets grumpy. Pt reports she took ibu yesterday for a headache and it didn't go away. Pt report she at 2 starbursts yesterday and her headache went away immediatly.

## 2020-02-19 NOTE — ED PROVIDER NOTES
"  History     Chief Complaint   Patient presents with     Blood Sugar Problem     States concerned that she has diabetes again. Reports had gestational diabetes 7 years ago. Reports she has been getting headaches that go away as soon as she eats a starburst candy. Does not want to be seen for headache. Just wants blood sugar checked.      HPI  Shania Hammond is a 45 year old female who presents with the request to have her blood sugar evaluated. She states she has been having episodes of irritability, for no reason, and headaches. She feels confused when she gets her headaches, and states she has been loosing weight. She states she changed codes at work and this is not allowed and she relates that to her confusion. She works normal day time hours at [employer]. Her symptoms are accompanied with chills, fatigue, ear pain, and light sensitivity. Her stomach, \"feels icky.\" Her headaches dissipate when she eats candy, coffee, and smokes. She ate a normal meal last evening and has not eaten much today. She states maybe she just slept wrong. She is currently participating in sleep study's that she states are, \"a joke\" and not helpful; she denies she has sleep apnea. Her family has a history of narcolepsy. She is on Modafinil. She took ibuprofen today without relief. Denies  chills, nausea, vomiting, diarrhea, and shortness of breath.    Allergies:  Allergies   Allergen Reactions     Morphine      Pt remembers having a red line in arm when she received IV morphine.      Bactrim [Sulfamethoxazole W-Trimethoprim] Other (See Comments)     Very bad intolerance does not wish to take.     Amoxicillin Rash       Problem List:    Patient Active Problem List    Diagnosis Date Noted     Abnormal uterine bleeding 11/13/2019     Priority: Medium     Added automatically from request for surgery [id number]       Rosacea 08/06/2015     Priority: Medium     Allergic rhinitis 08/06/2015     Priority: Medium     Problem list name updated by " automated process. Provider to review       Migraine 2015     Priority: Medium     Tobacco abuse      Priority: Medium     Postpartum care and examination immediately after delivery 2013     Priority: Medium        Past Medical History:    Past Medical History:   Diagnosis Date     Abnormal weight gain      Allergic rhinitis, cause unspecified 2011     Asthma      Depressive disorder, not elsewhere classified 2007     Diabetes (H)      Migraine      Tobacco abuse        Past Surgical History:    Past Surgical History:   Procedure Laterality Date      SECTION  ,      DILATION AND CURETTAGE  ?     DILATION AND CURETTAGE, HYSTEROSCOPY, ABLATE ENDOMETRIUM, COMBINED N/A 2019    Procedure: HYSTEROSCOPY DILATION AND CURETTAGE AND ENDOMETRIAL ABLATION;  Surgeon: Hussein Carrizales MD;  Location: [facility]     EGD with biopsy      Gastroesophageal reflux disease     GYN SURGERY       , leep       Family History:    Family History   Problem Relation Age of Onset     Allergies Sister      Cancer Mother      Diabetes Mother      Hypertension Mother      Irritable Bowel Syndrome Mother      Osteoporosis Mother      Cancer Son         Neuroblastoma     C.A.D. Father          of MI at age 63     Attention Deficit Disorder Sister      Asthma Sister      Depression Sister      Learning Disorder Sister      Migraines Sister        Social History:  Marital Status:   [2]  Social History     Tobacco Use     Smoking status: Current Every Day Smoker     Packs/day: 0.50     Years: 1.00     Pack years: 0.50     Types: Cigarettes     Smokeless tobacco: Never Used     Tobacco comment: Passive Exposure: Yes   Substance Use Topics     Alcohol use: Yes     Alcohol/week: 0.0 standard drinks     Comment: rare/ 4 drink max once a week or sometimes more     Drug use: No        Medications:    cetirizine (ZYRTEC) 10 MG tablet  cholecalciferol (VITAMIN D3) 5000 units (125 mcg)  capsule  HYDROcodone-acetaminophen (NORCO) 5-325 MG tablet  ibuprofen (ADVIL/MOTRIN) 800 MG tablet  loratadine (CLARITIN) 10 MG tablet  modafinil (PROVIGIL) 200 MG tablet  ondansetron (ZOFRAN-ODT) 4 MG ODT tab  pantoprazole (PROTONIX) 20 MG EC tablet  vitamin B complex with vitamin C (VITAMIN  B COMPLEX) tablet          Review of Systems   Constitutional: Positive for activity change, chills and fatigue. Negative for fever.   HENT: Positive for ear pain. Negative for sinus pressure and sinus pain.    Eyes: Positive for photophobia. Negative for visual disturbance.   Respiratory: Negative.    Gastrointestinal: Negative for diarrhea, nausea and vomiting.   Neurological: Positive for headaches. Negative for dizziness and light-headedness.   Psychiatric/Behavioral: Positive for agitation.        States feeling weak and confused. Feels like has regular stress       Physical Exam   BP: 127/86  Pulse: 95  Temp: 97.3  F (36.3  C)  Resp: 17  SpO2: 100 %      Physical Exam  Vitals signs and nursing note reviewed.   Constitutional:       General: She is not in acute distress.     Appearance: She is overweight.   HENT:      Head: Normocephalic.      Right Ear: Tympanic membrane and ear canal normal.      Left Ear: Tympanic membrane and ear canal normal.      Nose: Nose normal.      Right Sinus: No maxillary sinus tenderness or frontal sinus tenderness.      Left Sinus: No maxillary sinus tenderness or frontal sinus tenderness.      Comments: Right eye is puffy. No bony tenderness. She relates this to lack of sleep.     Mouth/Throat:      Lips: Pink.      Mouth: Mucous membranes are moist.      Pharynx: Oropharynx is clear. Uvula midline.   Eyes:      General: Lids are normal.      Extraocular Movements: Extraocular movements intact.      Conjunctiva/sclera: Conjunctivae normal.      Pupils: Pupils are equal, round, and reactive to light.      Right eye: Pupil is reactive and not sluggish.      Left eye: Pupil is reactive and  not sluggish.      Funduscopic exam:     Right eye: Red reflex present.         Left eye: Red reflex present.  Cardiovascular:      Rate and Rhythm: Normal rate and regular rhythm.      Heart sounds: Normal heart sounds. No murmur.   Pulmonary:      Effort: Pulmonary effort is normal. No respiratory distress.      Breath sounds: Normal breath sounds. No wheezing.   Abdominal:      Palpations: Abdomen is soft.   Lymphadenopathy:      Cervical: No cervical adenopathy.   Skin:     General: Skin is warm and dry.   Neurological:      General: No focal deficit present.      Mental Status: She is alert.      GCS: GCS eye subscore is 4. GCS verbal subscore is 5. GCS motor subscore is 6.      Cranial Nerves: Cranial nerves are intact.      Sensory: Sensation is intact.      Motor: Motor function is intact.      Coordination: Coordination is intact. Finger-Nose-Finger Test normal.      Gait: Gait is intact.   Psychiatric:         Attention and Perception: Attention normal.         Mood and Affect: Mood is anxious.         Speech: Speech normal.         Behavior: Behavior is cooperative.         Cognition and Memory: Cognition normal.         ED Course        Procedures             No results found for this or any previous visit (from the past 24 hour(s)).    Medications - No data to display    Assessments & Plan (with Medical Decision Making)     I have reviewed the nursing notes.    I have reviewed the findings, diagnosis, plan and need for follow up with the patient.  (Z00.00) Normal exam    Comment:45 year old female who presents with the request to have her blood sugar evaluated. She states she has been having episodes of irritability, for no reason, and headaches. She feels confused when she gets her headaches, and states she has been loosing weight. She states she changed codes at work and this is not allowed and she relates that to her confusion. She works normal day time hours at [employer]. Her symptoms are accompanied  "with chills, fatigue, ear pain, and light sensitivity. Her stomach, \"feels icky.\" Her headaches dissipate when she eats candy, coffee, and smokes. She ate a normal meal last evening and has not eaten much today. She states maybe she just slept wrong. She is currently participating in sleep study's that she states are, \"a joke\" and not helpful; she denies she has sleep apnea. Her family has a history of narcolepsy. She is on Modafinil. She took ibuprofen today without relief. Denies  chills, nausea, vomiting, diarrhea, and shortness of breath.     Assessment is normal except for her right eye is puffy. No bony tenderness. She relates this to lack of sleep. Normal neuro assessment.    Initial blood sugar was 80. CBC, CMP, A1c were normal.    Plan: she has scheduled a follow-up appointment with her PCP and encouraged to keep this appointment. Normal labs and she was notified of these results. Verbally encouraged to use cetirizine for puffiness around eyes.  These discharge instructions and medications were reviewed with her and understanding verbalized.    Discharge Medication List as of 2/19/2020  3:03 PM          Final diagnoses:   Normal exam       2/19/2020   [facility]       Daniella Wheeler, CNP  02/23/20 1053    "

## 2020-02-23 ASSESSMENT — ENCOUNTER SYMPTOMS: RESPIRATORY NEGATIVE: 1

## 2020-11-29 ENCOUNTER — HEALTH MAINTENANCE LETTER (OUTPATIENT)
Age: 45
End: 2020-11-29

## 2021-01-15 ENCOUNTER — HEALTH MAINTENANCE LETTER (OUTPATIENT)
Age: 46
End: 2021-01-15

## 2021-09-19 ENCOUNTER — HEALTH MAINTENANCE LETTER (OUTPATIENT)
Age: 46
End: 2021-09-19

## 2022-01-09 ENCOUNTER — HEALTH MAINTENANCE LETTER (OUTPATIENT)
Age: 47
End: 2022-01-09

## 2022-01-19 ENCOUNTER — APPOINTMENT (OUTPATIENT)
Dept: GENERAL RADIOLOGY | Facility: HOSPITAL | Age: 47
End: 2022-01-19
Attending: NURSE PRACTITIONER
Payer: OTHER GOVERNMENT

## 2022-01-19 ENCOUNTER — HOSPITAL ENCOUNTER (EMERGENCY)
Facility: HOSPITAL | Age: 47
Discharge: HOME | End: 2022-01-19
Attending: NURSE PRACTITIONER | Admitting: NURSE PRACTITIONER
Payer: OTHER GOVERNMENT

## 2022-01-19 VITALS
HEART RATE: 76 BPM | RESPIRATION RATE: 18 BRPM | OXYGEN SATURATION: 99 % | DIASTOLIC BLOOD PRESSURE: 71 MMHG | SYSTOLIC BLOOD PRESSURE: 106 MMHG | TEMPERATURE: 98.3 F

## 2022-01-19 DIAGNOSIS — U07.1 COVID-19 VIRUS INFECTION: Primary | ICD-10-CM

## 2022-01-19 PROCEDURE — 71045 X-RAY EXAM CHEST 1 VIEW: CPT

## 2022-01-19 PROCEDURE — G0463 HOSPITAL OUTPT CLINIC VISIT: HCPCS

## 2022-01-19 PROCEDURE — 99213 OFFICE O/P EST LOW 20 MIN: CPT | Performed by: NURSE PRACTITIONER

## 2022-01-19 ASSESSMENT — ENCOUNTER SYMPTOMS
WOUND: 0
MYALGIAS: 1
CHILLS: 0
SHORTNESS OF BREATH: 1
COUGH: 1
SINUS PAIN: 0
NAUSEA: 0
HEADACHES: 0
RHINORRHEA: 0
EYE PAIN: 0
PSYCHIATRIC NEGATIVE: 1
SORE THROAT: 1
PALPITATIONS: 0
VOMITING: 0
DIARRHEA: 1
TROUBLE SWALLOWING: 0
FEVER: 0
FATIGUE: 1
EYE ITCHING: 0
EYE REDNESS: 0
SINUS PRESSURE: 0

## 2022-01-20 NOTE — ED PROVIDER NOTES
History     Chief Complaint   Patient presents with     Shortness of Breath     Covid Concern     positive     HPI  Shania Hammond is a 47 year old female who presents to urgent care (ambulatory) with complaints of fatigue, congestion, sore throat, cough, SOB, diarrhea and myalgia which started Friday and had a COVID positive test on Friday.  Staying hydrated, normal output.  No rashes.  Allergy induced asthma, has albuterol inhaler has not used recently.  Current 1/2 PPD smoker.  No history of previous COVID infection.  No COVID Vaccines.  No other concerns.      Allergies:  Allergies   Allergen Reactions     Morphine      Pt remembers having a red line in arm when she received IV morphine.      Bactrim [Sulfamethoxazole W-Trimethoprim] Other (See Comments)     Very bad intolerance does not wish to take.     Amoxicillin Rash       Problem List:    Patient Active Problem List    Diagnosis Date Noted     Abnormal uterine bleeding 2019     Priority: Medium     Added automatically from request for surgery [id number]       Rosacea 2015     Priority: Medium     Allergic rhinitis 2015     Priority: Medium     Problem list name updated by automated process. Provider to review       Migraine 2015     Priority: Medium     Tobacco abuse      Priority: Medium     Postpartum care and examination immediately after delivery 2013     Priority: Medium        Past Medical History:    Past Medical History:   Diagnosis Date     Abnormal weight gain      Allergic rhinitis, cause unspecified 2011     Asthma      Depressive disorder, not elsewhere classified 2007     Diabetes (H)      Migraine      Tobacco abuse        Past Surgical History:    Past Surgical History:   Procedure Laterality Date      SECTION  ,      DILATION AND CURETTAGE  ?     DILATION AND CURETTAGE, HYSTEROSCOPY, ABLATE ENDOMETRIUM, COMBINED N/A 2019    Procedure: HYSTEROSCOPY DILATION AND CURETTAGE  AND ENDOMETRIAL ABLATION;  Surgeon: Hussein Carrizales MD;  Location: [facility]     EGD with biopsy      Gastroesophageal reflux disease     GYN SURGERY  2013     , leep       Family History:    Family History   Problem Relation Age of Onset     Allergies Sister      Cancer Mother      Diabetes Mother      Hypertension Mother      Irritable Bowel Syndrome Mother      Osteoporosis Mother      Cancer Son         Neuroblastoma     C.A.D. Father          of MI at age 63     Attention Deficit Disorder Sister      Asthma Sister      Depression Sister      Learning Disorder Sister      Migraines Sister        Social History:  Marital Status:   [2]  Social History     Tobacco Use     Smoking status: Current Every Day Smoker     Packs/day: 0.50     Years: 1.00     Pack years: 0.50     Types: Cigarettes     Smokeless tobacco: Never Used     Tobacco comment: Passive Exposure: Yes   Substance Use Topics     Alcohol use: Yes     Alcohol/week: 0.0 standard drinks     Comment: rare/ 4 drink max once a week or sometimes more     Drug use: No        Medications:    cetirizine (ZYRTEC) 10 MG tablet  cholecalciferol (VITAMIN D3) 5000 units (125 mcg) capsule  ibuprofen (ADVIL/MOTRIN) 800 MG tablet  loratadine (CLARITIN) 10 MG tablet  modafinil (PROVIGIL) 200 MG tablet  pantoprazole (PROTONIX) 20 MG EC tablet  vitamin B complex with vitamin C (VITAMIN  B COMPLEX) tablet      Review of Systems   Constitutional: Positive for fatigue. Negative for chills and fever.   HENT: Positive for congestion (mild) and sore throat (mild). Negative for ear pain, rhinorrhea, sinus pressure, sinus pain and trouble swallowing.    Eyes: Negative for pain, redness and itching.   Respiratory: Positive for cough and shortness of breath.    Cardiovascular: Negative for chest pain and palpitations.   Gastrointestinal: Positive for diarrhea. Negative for nausea and vomiting.   Genitourinary: Negative for decreased urine volume.   Musculoskeletal:  Positive for myalgias. Negative for gait problem.   Skin: Negative for rash and wound.   Neurological: Negative for headaches.   Psychiatric/Behavioral: Negative.      Physical Exam   BP: 106/71  Pulse: 76  Temp: 98.3  F (36.8  C)  Resp: 18  SpO2: 99 %    Physical Exam  Vitals and nursing note reviewed.   Constitutional:       General: She is not in acute distress.     Appearance: She is not ill-appearing or toxic-appearing.   HENT:      Head: Normocephalic.      Right Ear: Tympanic membrane, ear canal and external ear normal.      Left Ear: Tympanic membrane, ear canal and external ear normal.      Nose: Congestion present. No rhinorrhea.      Mouth/Throat:      Mouth: Mucous membranes are moist.      Pharynx: Oropharynx is clear. No oropharyngeal exudate or posterior oropharyngeal erythema.   Eyes:      Extraocular Movements: Extraocular movements intact.      Conjunctiva/sclera: Conjunctivae normal.      Pupils: Pupils are equal, round, and reactive to light.   Cardiovascular:      Rate and Rhythm: Normal rate and regular rhythm.      Pulses: Normal pulses.      Heart sounds: Normal heart sounds.   Pulmonary:      Effort: Pulmonary effort is normal.      Breath sounds: Normal breath sounds.   Abdominal:      General: Bowel sounds are normal.      Palpations: Abdomen is soft.      Tenderness: There is no abdominal tenderness.   Musculoskeletal:      Cervical back: Normal range of motion and neck supple. No rigidity or tenderness.   Lymphadenopathy:      Cervical: No cervical adenopathy.   Neurological:      Mental Status: She is alert.   Psychiatric:         Mood and Affect: Mood normal.       ED Course     No results found for this or any previous visit (from the past 24 hour(s)).    Medications - No data to display    Assessments & Plan (with Medical Decision Making)     I have reviewed the nursing notes.    I have reviewed the findings, diagnosis, plan and need for follow up with the patient.  (U07.1) COVID-19  virus infection  (primary encounter diagnosis)  Plan:   Patient ambulatory with a nontoxic appearance.  Lungs clear throughout.  Patient COVID-positive as of this past Friday.  Staying hydrated, normal output.  No rashes.  Chest x-ray completed and impression shows no acute findings.  Symptomatic treatment recommendations provided.  Patient to follow-up with primary care provider or return to urgent care-ED with any worsening in condition or additional concerns.  Patient is in agreement with treatment plan.    New Prescriptions    No medications on file     Final diagnoses:   COVID-19 virus infection     1/19/2022   [facility]     Keara Gilbert NP  01/19/22 2021

## 2022-01-20 NOTE — ED NOTES
Pt presents with reports of having trouble breathing.  Legs are painful.  Body aches.  Pt reports to having bad circulation all the time nothing new.  Legs feels restless dx with covid on Friday with an at home test.  Pt able to speak in full clear sentences without incident.

## 2022-03-06 ENCOUNTER — HEALTH MAINTENANCE LETTER (OUTPATIENT)
Age: 47
End: 2022-03-06

## 2022-11-21 ENCOUNTER — HEALTH MAINTENANCE LETTER (OUTPATIENT)
Age: 47
End: 2022-11-21

## 2023-04-16 ENCOUNTER — HEALTH MAINTENANCE LETTER (OUTPATIENT)
Age: 48
End: 2023-04-16

## 2023-06-12 ENCOUNTER — TELEPHONE (OUTPATIENT)
Dept: OBGYN | Facility: OTHER | Age: 48
End: 2023-06-12

## 2023-06-12 NOTE — TELEPHONE ENCOUNTER
6/12/2023 9:04 AM  Writer returned pt's phone call for an appt request. Pt stated she would have to call back due to at work now.  Earline Denton RN

## 2023-06-22 ENCOUNTER — OFFICE VISIT (OUTPATIENT)
Dept: OBGYN | Facility: OTHER | Age: 48
End: 2023-06-22
Attending: NURSE PRACTITIONER
Payer: COMMERCIAL

## 2023-06-22 VITALS
SYSTOLIC BLOOD PRESSURE: 112 MMHG | WEIGHT: 198 LBS | HEART RATE: 83 BPM | BODY MASS INDEX: 33.8 KG/M2 | DIASTOLIC BLOOD PRESSURE: 72 MMHG | HEIGHT: 64 IN | OXYGEN SATURATION: 98 %

## 2023-06-22 DIAGNOSIS — Z11.3 SCREEN FOR STD (SEXUALLY TRANSMITTED DISEASE): ICD-10-CM

## 2023-06-22 DIAGNOSIS — Z12.31 ENCOUNTER FOR SCREENING MAMMOGRAM FOR BREAST CANCER: ICD-10-CM

## 2023-06-22 DIAGNOSIS — Z23 NEED FOR VACCINE FOR DT (DIPHTHERIA-TETANUS): ICD-10-CM

## 2023-06-22 DIAGNOSIS — Z12.4 CERVICAL CANCER SCREENING: ICD-10-CM

## 2023-06-22 DIAGNOSIS — Z01.419 WELL WOMAN EXAM: Primary | ICD-10-CM

## 2023-06-22 LAB
C TRACH DNA SPEC QL PROBE+SIG AMP: NEGATIVE
CLUE CELLS: PRESENT
N GONORRHOEA DNA SPEC QL NAA+PROBE: NEGATIVE
TRICHOMONAS, WET PREP: ABNORMAL
WBC'S/HIGH POWER FIELD, WET PREP: ABNORMAL
YEAST, WET PREP: ABNORMAL

## 2023-06-22 PROCEDURE — 36415 COLL VENOUS BLD VENIPUNCTURE: CPT | Mod: ZL | Performed by: NURSE PRACTITIONER

## 2023-06-22 PROCEDURE — 86803 HEPATITIS C AB TEST: CPT | Mod: ZL | Performed by: NURSE PRACTITIONER

## 2023-06-22 PROCEDURE — 87389 HIV-1 AG W/HIV-1&-2 AB AG IA: CPT | Mod: ZL | Performed by: NURSE PRACTITIONER

## 2023-06-22 PROCEDURE — 87591 N.GONORRHOEAE DNA AMP PROB: CPT | Mod: ZL | Performed by: NURSE PRACTITIONER

## 2023-06-22 PROCEDURE — 99396 PREV VISIT EST AGE 40-64: CPT | Performed by: NURSE PRACTITIONER

## 2023-06-22 PROCEDURE — 86780 TREPONEMA PALLIDUM: CPT | Mod: ZL | Performed by: NURSE PRACTITIONER

## 2023-06-22 PROCEDURE — G0123 SCREEN CERV/VAG THIN LAYER: HCPCS | Mod: ZL | Performed by: NURSE PRACTITIONER

## 2023-06-22 PROCEDURE — 87624 HPV HI-RISK TYP POOLED RSLT: CPT | Mod: ZL | Performed by: NURSE PRACTITIONER

## 2023-06-22 PROCEDURE — 90714 TD VACC NO PRESV 7 YRS+ IM: CPT

## 2023-06-22 PROCEDURE — 87210 SMEAR WET MOUNT SALINE/INK: CPT | Mod: ZL | Performed by: NURSE PRACTITIONER

## 2023-06-22 ASSESSMENT — PATIENT HEALTH QUESTIONNAIRE - PHQ9
5. POOR APPETITE OR OVEREATING: NOT AT ALL
SUM OF ALL RESPONSES TO PHQ QUESTIONS 1-9: 4

## 2023-06-22 ASSESSMENT — PAIN SCALES - GENERAL: PAINLEVEL: NO PAIN (0)

## 2023-06-22 ASSESSMENT — ANXIETY QUESTIONNAIRES
7. FEELING AFRAID AS IF SOMETHING AWFUL MIGHT HAPPEN: NOT AT ALL
GAD7 TOTAL SCORE: 1
5. BEING SO RESTLESS THAT IT IS HARD TO SIT STILL: NOT AT ALL
1. FEELING NERVOUS, ANXIOUS, OR ON EDGE: NOT AT ALL
2. NOT BEING ABLE TO STOP OR CONTROL WORRYING: NOT AT ALL
GAD7 TOTAL SCORE: 1
IF YOU CHECKED OFF ANY PROBLEMS ON THIS QUESTIONNAIRE, HOW DIFFICULT HAVE THESE PROBLEMS MADE IT FOR YOU TO DO YOUR WORK, TAKE CARE OF THINGS AT HOME, OR GET ALONG WITH OTHER PEOPLE: NOT DIFFICULT AT ALL
3. WORRYING TOO MUCH ABOUT DIFFERENT THINGS: NOT AT ALL
6. BECOMING EASILY ANNOYED OR IRRITABLE: SEVERAL DAYS

## 2023-06-22 NOTE — PROGRESS NOTES
"Subjective:  48 year old female here for annual well women visit. . History of . History of endometrial ablation due to menorrhagia in 2019. Has had minimal spotting since ablation. Two weeks ago had 1.5 days of light intermittent spotting. LMP date unknown, prior to ablation procedure. Has had 9 months of night sweats and intermittent hot flashes. Symptoms are not interrupting sleep. Sexually active with . Would like STI screening today. Last pap was 19, NIL and HPV negative. History of leep procedure. Denies vaginal symptoms or concerns. Due for mammogram. No current breast concerns. Due to Td vaccine. No concerns with mood.  Remainder of her care is through her PCP.     Past Medical History:   Diagnosis Date     Abnormal weight gain     Resolved; Pineda Angel     Allergic rhinitis, cause unspecified 2011     Asthma      Depressive disorder, not elsewhere classified 2007     Diabetes (H)      Migraine      Tobacco abuse      Past Surgical History:   Procedure Laterality Date      SECTION  ,      DILATION AND CURETTAGE  ?     DILATION AND CURETTAGE, HYSTEROSCOPY, ABLATE ENDOMETRIUM, COMBINED N/A 2019    Procedure: HYSTEROSCOPY DILATION AND CURETTAGE AND ENDOMETRIAL ABLATION;  Surgeon: Hussein Carrizales MD;  Location: [facility]     EGD with biopsy      Gastroesophageal reflux disease     GYN SURGERY       , leep      Health Maintenance:   - Immunizations reviewed, due to tentanus booster.   - Pap: Last screening 19, NIL, HPV negative  - Mammogram: Due, Last screening , no history of abnormal pap smear.   - HILARY-7: Completed today, score 1.   - PHQ-9: Completed today, score 4.     ROS: All ROS negative, unless otherwise stated in HPI.     Objective:   Physical Exam:   /72 (BP Location: Right arm, Cuff Size: Adult Regular)   Pulse 83   Ht 1.626 m (5' 4\")   Wt 89.8 kg (198 lb)   SpO2 98%   BMI 33.99 kg/m    General: " Patient is alert and oriented x3. No acute distress.   Neck/Thyroid: Neck is supple. No thyroid masses, nodules or goiter. No lymphadenopathy.   CV: RRR. No murmur, rubs or gallops.   Pulm: Lungs clear to auscultation bilaterally. No wheezes or rhonchi.   Breast: Nontender bilaterally. No lesions, lumps or skin erythema or changes. No axillary lymphadenopathy.   Abdominal: No abdominal tenderness noted.   Pelvic: Vulva has no lesions, masses, rashes. Vagina is pink and moist. No vaginal discharge, bleeding or lesions. Cervix is pink. No cervical lesions, inflammation, erythema, or discharge. No cervical motion tenderness or adenexal tenderness.     Assessment/Plan:   (Z01.419) Well woman exam  (primary encounter diagnosis)  Patient seen for annual well woman. Discussed monitoring perimenopausal symptoms and waiting to check FSH levels until next year, as her symptoms are just beginning. Pap completed today. Mammogram ordered, patient will schedule. Td vaccine given today. Will see patient back next year for annual exam.     (Z12.4) Cervical cancer screening      (Z11.3) Screen for STD (sexually transmitted disease)  Chlamydia trachomatis/Neisseria gonorrhoeae by PCR  Hepatitis C antibody  HIV Antigen Antibody Combo  Treponema Abs w Reflex to RPR and Titer  Wet preparation      (Z12.31) Encounter for screening mammogram for breast cancer  [facility] Screening Digital Bilateral

## 2023-06-23 DIAGNOSIS — B96.89 BACTERIAL VAGINOSIS: Primary | ICD-10-CM

## 2023-06-23 DIAGNOSIS — N76.0 BACTERIAL VAGINOSIS: Primary | ICD-10-CM

## 2023-06-23 PROCEDURE — 90714 TD VACC NO PRESV 7 YRS+ IM: CPT | Performed by: NURSE PRACTITIONER

## 2023-06-23 RX ORDER — METRONIDAZOLE 500 MG/1
500 TABLET ORAL 2 TIMES DAILY
Qty: 14 TABLET | Refills: 0 | Status: SHIPPED | OUTPATIENT
Start: 2023-06-23 | End: 2023-06-30

## 2023-06-24 LAB
HCV AB SERPL QL IA: NONREACTIVE
HIV 1+2 AB+HIV1 P24 AG SERPL QL IA: NONREACTIVE
T PALLIDUM AB SER QL: NONREACTIVE

## 2023-06-28 LAB
BKR LAB AP GYN ADEQUACY: NORMAL
BKR LAB AP GYN INTERPRETATION: NORMAL
BKR LAB AP GYN OTHER FINDINGS: NORMAL
BKR LAB AP HPV REFLEX: NORMAL
BKR LAB AP PREVIOUS ABNORMAL: NORMAL
PATH REPORT.COMMENTS IMP SPEC: NORMAL
PATH REPORT.COMMENTS IMP SPEC: NORMAL
PATH REPORT.RELEVANT HX SPEC: NORMAL

## 2023-06-30 LAB
HUMAN PAPILLOMA VIRUS 16 DNA: NEGATIVE
HUMAN PAPILLOMA VIRUS 18 DNA: NEGATIVE
HUMAN PAPILLOMA VIRUS FINAL DIAGNOSIS: NORMAL
HUMAN PAPILLOMA VIRUS OTHER HR: NEGATIVE

## 2023-07-09 ENCOUNTER — HEALTH MAINTENANCE LETTER (OUTPATIENT)
Age: 48
End: 2023-07-09

## 2023-08-15 ENCOUNTER — ANCILLARY PROCEDURE (OUTPATIENT)
Dept: MAMMOGRAPHY | Facility: OTHER | Age: 48
End: 2023-08-15
Attending: NURSE PRACTITIONER
Payer: COMMERCIAL

## 2023-08-15 ENCOUNTER — ANCILLARY ORDERS (OUTPATIENT)
Dept: OBGYN | Facility: OTHER | Age: 48
End: 2023-08-15

## 2023-08-15 DIAGNOSIS — R92.30 DENSE BREAST TISSUE ON MAMMOGRAM: Primary | ICD-10-CM

## 2023-08-15 DIAGNOSIS — Z12.31 ENCOUNTER FOR SCREENING MAMMOGRAM FOR BREAST CANCER: ICD-10-CM

## 2023-08-15 DIAGNOSIS — Z11.3 SCREEN FOR STD (SEXUALLY TRANSMITTED DISEASE): ICD-10-CM

## 2023-08-15 DIAGNOSIS — Z01.419 WELL WOMAN EXAM: Primary | ICD-10-CM

## 2023-08-15 DIAGNOSIS — Z23 NEED FOR VACCINE FOR DT (DIPHTHERIA-TETANUS): ICD-10-CM

## 2023-08-15 DIAGNOSIS — Z12.4 CERVICAL CANCER SCREENING: ICD-10-CM

## 2023-08-15 PROCEDURE — 77067 SCR MAMMO BI INCL CAD: CPT | Mod: TC

## 2023-08-16 ENCOUNTER — TELEPHONE (OUTPATIENT)
Dept: MAMMOGRAPHY | Facility: OTHER | Age: 48
End: 2023-08-16

## 2023-08-22 ENCOUNTER — HOSPITAL ENCOUNTER (OUTPATIENT)
Dept: MAMMOGRAPHY | Facility: OTHER | Age: 48
Discharge: HOME | End: 2023-08-22
Attending: OBSTETRICS & GYNECOLOGY
Payer: COMMERCIAL

## 2023-08-22 ENCOUNTER — HOSPITAL ENCOUNTER (OUTPATIENT)
Dept: ULTRASOUND IMAGING | Facility: HOSPITAL | Age: 48
Discharge: HOME | End: 2023-08-22
Attending: OBSTETRICS & GYNECOLOGY
Payer: COMMERCIAL

## 2023-08-22 DIAGNOSIS — R92.30 DENSE BREAST TISSUE ON MAMMOGRAM: ICD-10-CM

## 2023-08-22 PROCEDURE — 76642 ULTRASOUND BREAST LIMITED: CPT | Mod: LT

## 2023-08-22 PROCEDURE — 77061 BREAST TOMOSYNTHESIS UNI: CPT | Mod: TC,LT

## 2023-11-26 ENCOUNTER — HEALTH MAINTENANCE LETTER (OUTPATIENT)
Age: 48
End: 2023-11-26

## 2024-04-14 ENCOUNTER — HEALTH MAINTENANCE LETTER (OUTPATIENT)
Age: 49
End: 2024-04-14

## 2024-07-31 ENCOUNTER — HOSPITAL ENCOUNTER (EMERGENCY)
Facility: HOSPITAL | Age: 49
Discharge: HOME | End: 2024-07-31
Payer: COMMERCIAL

## 2024-07-31 VITALS
DIASTOLIC BLOOD PRESSURE: 76 MMHG | WEIGHT: 202.2 LBS | RESPIRATION RATE: 16 BRPM | HEART RATE: 82 BPM | OXYGEN SATURATION: 97 % | BODY MASS INDEX: 34.52 KG/M2 | TEMPERATURE: 99.5 F | HEIGHT: 64 IN | SYSTOLIC BLOOD PRESSURE: 109 MMHG

## 2024-07-31 DIAGNOSIS — H66.92 ACUTE LEFT OTITIS MEDIA: ICD-10-CM

## 2024-07-31 DIAGNOSIS — J01.90 ACUTE SINUSITIS WITH SYMPTOMS > 10 DAYS: ICD-10-CM

## 2024-07-31 LAB
FLUAV RNA SPEC QL NAA+PROBE: NEGATIVE
FLUBV RNA RESP QL NAA+PROBE: NEGATIVE
GROUP A STREP BY PCR: NOT DETECTED
RSV RNA SPEC NAA+PROBE: NEGATIVE
SARS-COV-2 RNA RESP QL NAA+PROBE: NEGATIVE

## 2024-07-31 PROCEDURE — G0463 HOSPITAL OUTPT CLINIC VISIT: HCPCS

## 2024-07-31 PROCEDURE — 99213 OFFICE O/P EST LOW 20 MIN: CPT

## 2024-07-31 PROCEDURE — 87637 SARSCOV2&INF A&B&RSV AMP PRB: CPT

## 2024-07-31 PROCEDURE — 87651 STREP A DNA AMP PROBE: CPT

## 2024-07-31 RX ORDER — FLUCONAZOLE 150 MG/1
150 TABLET ORAL ONCE
Qty: 1 TABLET | Refills: 0 | Status: SHIPPED | OUTPATIENT
Start: 2024-07-31 | End: 2024-07-31

## 2024-07-31 RX ORDER — CEFDINIR 300 MG/1
300 CAPSULE ORAL 2 TIMES DAILY
Qty: 20 CAPSULE | Refills: 0 | Status: SHIPPED | OUTPATIENT
Start: 2024-07-31 | End: 2024-08-10

## 2024-07-31 ASSESSMENT — ENCOUNTER SYMPTOMS
COUGH: 1
SINUS PAIN: 1
CHILLS: 1
SINUS PRESSURE: 1
ACTIVITY CHANGE: 0
SORE THROAT: 1
APPETITE CHANGE: 0
FATIGUE: 1
FEVER: 1

## 2024-07-31 ASSESSMENT — ACTIVITIES OF DAILY LIVING (ADL): ADLS_ACUITY_SCORE: 35

## 2024-07-31 NOTE — ED TRIAGE NOTES
PACU ANESTHESIA ADMISSION NOTE      Procedure: Umbilical hernia repair with mesh      Post op diagnosis:  Umbilical hernia        ____  Intubated  TV:______       Rate: ______      FiO2: ______    __x__  Patent Airway    __x__  Full return of protective reflexes    __x__  Full recovery from anesthesia / back to baseline     Vitals:   T: 97.8          R:    14              BP:   91/55               Sat:   96%                P: 80      Mental Status:  __x__ Awake   __x___ Alert   _____ Drowsy   _____ Sedated    Nausea/Vomiting:  __x__ NO  ______Yes,   See Post - Op Orders          Pain Scale (0-10):  __0___    Treatment: ____ None    ___x_ See Post - Op/PCA Orders    Post - Operative Fluids:   ____ Oral   __x__ See Post - Op Orders    Plan: Discharge:   __x__Home       _____Floor     _____Critical Care    _____  Other:_________________    Comments:  pt tolerated procedure well, pt transferred to PACU and report was given to PACU RN, vital signs are stable and pt shows no signs of distress. no anesthesia complications, discharge pt when criteria met. Pt presents with c/o bilateral ear pain, sore throat, swollen lymph nodes, and fevers. Also has sinus pressure. Sx started last weekend. Denies known exposures. Pt has taken vitamin B complex. Pt states she has taken ibuprofen or tylenol when she's feeling feverish.

## 2024-07-31 NOTE — DISCHARGE INSTRUCTIONS
Cefdinir 2 times daily for 10 days. Yogurt or probiotic while taking.   Consider sudafed every 6 hours as needed for congestion and ear pressure.   Push fluids.   Tylenol and ibuprofen as needed.  Return with any new or concerning symptoms. Follow up in the clinic as needed.

## 2024-07-31 NOTE — ED PROVIDER NOTES
History     Chief Complaint   Patient presents with    Pharyngitis     HPI  Shania Hammond is a 49 year old female who presents to the urgent     Allergies:  Allergies   Allergen Reactions    Morphine      Pt remembers having a red line in arm when she received IV morphine.     Bactrim [Sulfamethoxazole W-Trimethoprim] Other (See Comments)     Very bad intolerance does not wish to take.    Amoxicillin Rash       Problem List:    Patient Active Problem List    Diagnosis Date Noted    Abnormal uterine bleeding 2019     Priority: Medium     Added automatically from request for surgery [id number]      Rosacea 2015     Priority: Medium    Allergic rhinitis 2015     Priority: Medium     Problem list name updated by automated process. Provider to review      Migraine 2015     Priority: Medium    Tobacco abuse      Priority: Medium    Postpartum care and examination immediately after delivery 2013     Priority: Medium    Adiposity 2012     Priority: Medium    Type 2 diabetes mellitus (H) 2012     Priority: Medium        Past Medical History:    Past Medical History:   Diagnosis Date    Abnormal weight gain     Allergic rhinitis, cause unspecified 2011    Asthma     Depressive disorder, not elsewhere classified 2007    Diabetes (H)     Migraine     Tobacco abuse        Past Surgical History:    Past Surgical History:   Procedure Laterality Date     SECTION  ,     DILATION AND CURETTAGE  ?    DILATION AND CURETTAGE, HYSTEROSCOPY, ABLATE ENDOMETRIUM, COMBINED N/A 2019    Procedure: HYSTEROSCOPY DILATION AND CURETTAGE AND ENDOMETRIAL ABLATION;  Surgeon: Hussein Carrizales MD;  Location: [facility]    EGD with biopsy      Gastroesophageal reflux disease    GYN SURGERY  2013     , leep       Family History:    Family History   Problem Relation Age of Onset    Allergies Sister     Cancer Mother     Diabetes Mother     Hypertension Mother      "Irritable Bowel Syndrome Mother     Osteoporosis Mother     Cancer Son         Neuroblastoma    C.A.D. Father          of MI at age 63    Attention Deficit Disorder Sister     Asthma Sister     Depression Sister     Learning Disorder Sister     Migraines Sister        Social History:  Marital Status:   [2]  Social History     Tobacco Use    Smoking status: Every Day     Current packs/day: 0.50     Average packs/day: 0.5 packs/day for 1 year (0.5 ttl pk-yrs)     Types: Cigarettes    Smokeless tobacco: Never    Tobacco comments:     Passive Exposure: Yes   Substance Use Topics    Alcohol use: Yes     Alcohol/week: 0.0 standard drinks of alcohol     Comment: rare/ 4 drink max once a week or sometimes more    Drug use: No        Medications:    cefdinir (OMNICEF) 300 MG capsule  cholecalciferol (VITAMIN D3) 5000 units (125 mcg) capsule  fluconazole (DIFLUCAN) 150 MG tablet  ibuprofen (ADVIL/MOTRIN) 800 MG tablet  loratadine (CLARITIN) 10 MG tablet  Multiple Vitamin (MULTIVITAMIN ADULT PO)  vitamin B complex with vitamin C (VITAMIN  B COMPLEX) tablet          Review of Systems   Constitutional:  Positive for chills, fatigue and fever. Negative for activity change and appetite change.   HENT:  Positive for congestion, ear pain, sinus pressure, sinus pain and sore throat.    Respiratory:  Positive for cough.    All other systems reviewed and are negative.      Physical Exam   BP: 109/76  Pulse: 82  Temp: 99.5  F (37.5  C)  Resp: 16  Height: 162.6 cm (5' 4\")  Weight: 91.7 kg (202 lb 3.2 oz)  SpO2: 97 %      Physical Exam  Vitals and nursing note reviewed.   Constitutional:       General: She is not in acute distress.     Appearance: She is well-developed. She is ill-appearing. She is not toxic-appearing or diaphoretic.   HENT:      Head:      Jaw: No trismus.      Right Ear: Tympanic membrane is injected.      Left Ear: Tympanic membrane is erythematous.      Mouth/Throat:      Pharynx: Uvula midline. Posterior " oropharyngeal erythema present. No pharyngeal swelling, oropharyngeal exudate or uvula swelling.   Cardiovascular:      Rate and Rhythm: Normal rate and regular rhythm.      Heart sounds: Normal heart sounds. No murmur heard.  Neurological:      Mental Status: She is alert.         ED Course        Procedures           No results found for this or any previous visit (from the past 24 hour(s)).    Medications - No data to display    Assessments & Plan (with Medical Decision Making)     I have reviewed the nursing notes.    I have reviewed the findings, diagnosis, plan and need for follow up with the patient.  Cefdinir 2 times daily for 10 days. Yogurt or probiotic while taking.   Consider sudafed every 6 hours as needed for congestion and ear pressure.   Push fluids.   Tylenol and ibuprofen as needed.  Return with any new or concerning symptoms. Follow up in the clinic as needed.     MDM: vital signs normal, temp 99.5. non toxic in appearance with no noted distress. Lungs clear, heart tones regular. Mild erythema to posterior oropharynx. Uvula midline. No trismus, drooling, or swallowing difficulty. Strep and covid pending. Erythema to left TM, injection to right. No mastoid tenderness or erythema. Cefdinir prescribed. She declines flonase. Supportive measures and return precautions discussed. She is in agreement with plan.     (H66.92) Acute left otitis media, (J01.90) Acute sinusitis with symptoms > 10 days  Plan: Cefdinir 2 times daily for 10 days. Yogurt or probiotic while taking.   Consider sudafed every 6 hours as needed for congestion and ear pressure.   Push fluids.   Tylenol and ibuprofen as needed.  Return with any new or concerning symptoms. Follow up in the clinic as needed. Understanding verbalized.       Discharge Medication List as of 7/31/2024  6:12 PM          Final diagnoses:   Acute left otitis media   Acute sinusitis with symptoms > 10 days       7/31/2024   [facility]       Chetan  Deyanira KELLER, PAULO  07/31/24 1824

## 2024-08-31 ENCOUNTER — HEALTH MAINTENANCE LETTER (OUTPATIENT)
Age: 49
End: 2024-08-31

## 2024-09-11 ENCOUNTER — TELEPHONE (OUTPATIENT)
Dept: OTOLARYNGOLOGY | Facility: OTHER | Age: 49
End: 2024-09-11

## 2024-09-11 NOTE — TELEPHONE ENCOUNTER
Spoke to patient via phone.  Appointment changed, request per Hanane Osborn to earlier day 9-12-24, advised check in at 12:45 p.m. patient verbalized understanding.

## 2024-09-12 ENCOUNTER — OFFICE VISIT (OUTPATIENT)
Dept: OTOLARYNGOLOGY | Facility: OTHER | Age: 49
End: 2024-09-12
Attending: PHYSICIAN ASSISTANT
Payer: COMMERCIAL

## 2024-09-12 ENCOUNTER — TELEPHONE (OUTPATIENT)
Dept: OTOLARYNGOLOGY | Facility: OTHER | Age: 49
End: 2024-09-12

## 2024-09-12 VITALS
DIASTOLIC BLOOD PRESSURE: 81 MMHG | OXYGEN SATURATION: 97 % | WEIGHT: 202 LBS | HEIGHT: 64 IN | TEMPERATURE: 98.1 F | BODY MASS INDEX: 34.49 KG/M2 | SYSTOLIC BLOOD PRESSURE: 114 MMHG | HEART RATE: 98 BPM

## 2024-09-12 DIAGNOSIS — R05.2 SUBACUTE COUGH: ICD-10-CM

## 2024-09-12 DIAGNOSIS — R09.82 POST-NASAL DRAINAGE: Primary | ICD-10-CM

## 2024-09-12 DIAGNOSIS — H69.93 DYSFUNCTION OF BOTH EUSTACHIAN TUBES: ICD-10-CM

## 2024-09-12 DIAGNOSIS — J34.3 NASAL TURBINATE HYPERTROPHY: ICD-10-CM

## 2024-09-12 DIAGNOSIS — J34.2 DNS (DEVIATED NASAL SEPTUM): ICD-10-CM

## 2024-09-12 PROCEDURE — 99214 OFFICE O/P EST MOD 30 MIN: CPT | Mod: 25 | Performed by: PHYSICIAN ASSISTANT

## 2024-09-12 PROCEDURE — 92504 EAR MICROSCOPY EXAMINATION: CPT | Performed by: PHYSICIAN ASSISTANT

## 2024-09-12 PROCEDURE — 31575 DIAGNOSTIC LARYNGOSCOPY: CPT | Performed by: PHYSICIAN ASSISTANT

## 2024-09-12 PROCEDURE — G0463 HOSPITAL OUTPT CLINIC VISIT: HCPCS | Mod: 25

## 2024-09-12 RX ORDER — CETIRIZINE HYDROCHLORIDE 10 MG/1
10 TABLET ORAL DAILY
Qty: 30 TABLET | Refills: 11 | Status: SHIPPED | OUTPATIENT
Start: 2024-09-12

## 2024-09-12 RX ORDER — PREDNISONE 20 MG/1
20 TABLET ORAL DAILY
Qty: 5 TABLET | Refills: 0 | Status: SHIPPED | OUTPATIENT
Start: 2024-09-12

## 2024-09-12 RX ORDER — ALBUTEROL SULFATE 90 UG/1
2 AEROSOL, METERED RESPIRATORY (INHALATION) EVERY 6 HOURS PRN
Qty: 18 G | Refills: 1 | Status: SHIPPED | OUTPATIENT
Start: 2024-09-12

## 2024-09-12 ASSESSMENT — PAIN SCALES - GENERAL: PAINLEVEL: NO PAIN (0)

## 2024-09-12 NOTE — TELEPHONE ENCOUNTER
Received PA request from [facility] regarding beclomethasone (QNASL) 80 MCG/ACT nasal aerosol. Submitted via CM, awaiting response

## 2024-09-12 NOTE — PROGRESS NOTES
Otolaryngology Consultation     Patient: Shania Hammond  : 1975     Patient presents with:  Throat Problem: Throat issues        HPI:  Shania Hammond is a 49 year old female seen today for throat concerns.   She was seen in  on 24 and treated for acute sinusitis, acute otitis media.      Patient presents for concerns of lymphadenopathy, sore throat and took treatment of abx with some relief. She had exposure of sawdust and increase in post nasal drainage, coughing.   She has developed significant post nasal drainage. She does have sensation of a breeze in her ears. She did not feel substantial improvement with her symptoms.          Patient denies sore throat or throat pain  Denies chronic otalgia.   Denies weight loss. She feels hot flashes, sweating.   Denies dysphagia or dysphonia.   She feels her voice is hoarse related to the drainage/ throat clearing.   Denies globus sensation.      Water- 2 large bottles.   Caffeine- one cup coffee  ETOH- social, 2/ week  Tobacco- 1 ppd.   Reflux- no. Improved since holding medications.     She has felt no significant ear pain. She had felt water in her ear. She has felt intermittent popping/ crackling w/ her ears.   Denies  otologic surgeries. Hx of childhood COM.   Denies otalgia, otorrhea  Denies worrisome tinnitus  Denies fluctuating hearing loss or tinnitus.   Denies vertigo or facial paraesthesia.     Allergies- she does have mild seasonal allergies.   No recent use of nasal sprays.   She had severe side effect of nasal sprays. Past trial of sinus rinses- no.   No recent AH use.   No recent hearing test.     Current Outpatient Rx          Current Outpatient Rx   Medication Sig Dispense Refill    cholecalciferol (VITAMIN D3) 5000 units (125 mcg) capsule TAKE 1 CAPSULE BY MOUTH ONCE DAILY   0    ibuprofen (ADVIL/MOTRIN) 800 MG tablet Take 1 tablet (800 mg) by mouth every 8 hours as needed for pain 30 tablet 1    loratadine (CLARITIN) 10 MG tablet Take 10 mg by  "mouth daily        Multiple Vitamin (MULTIVITAMIN ADULT PO) Take 1 tablet by mouth daily        vitamin B complex with vitamin C (VITAMIN  B COMPLEX) tablet Take 1 tablet by mouth daily                Allergies: Morphine, Bactrim [sulfamethoxazole w-trimethoprim], and Amoxicillin      Past Medical History        Past Medical History:   Diagnosis Date    Abnormal weight gain      Resolved; Pineda Angel    Allergic rhinitis, cause unspecified 2011    Asthma      Depressive disorder, not elsewhere classified 2007    Diabetes (H)      Migraine      Tobacco abuse              Past Surgical History         Past Surgical History:   Procedure Laterality Date     SECTION   ,     DILATION AND CURETTAGE   ?    DILATION AND CURETTAGE, HYSTEROSCOPY, ABLATE ENDOMETRIUM, COMBINED N/A 2019     Procedure: HYSTEROSCOPY DILATION AND CURETTAGE AND ENDOMETRIAL ABLATION;  Surgeon: Hussein Carrizales MD;  Location: [facility]    EGD with biopsy        Gastroesophageal reflux disease    GYN SURGERY         , leep            ENT family history reviewed     Social History               Tobacco Use    Smoking status: Every Day       Current packs/day: 0.50       Average packs/day: 0.5 packs/day for 1 year (0.5 ttl pk-yrs)       Types: Cigarettes    Smokeless tobacco: Never    Tobacco comments:       Passive Exposure: Yes   Substance Use Topics    Alcohol use: Yes       Alcohol/week: 0.0 standard drinks of alcohol       Comment: rare/ 4 drink max once a week or sometimes more    Drug use: No            Review of Systems  ROS: 10 point ROS neg other than the symptoms noted above in the HPI      Physical Exam  /81 (BP Location: Right arm, Patient Position: Sitting, Cuff Size: Adult Large)   Pulse 98   Temp 98.1  F (36.7  C) (Tympanic)   Ht 1.626 m (5' 4\")   Wt 91.6 kg (202 lb)   SpO2 97%   BMI 34.67 kg/m      General - The patient is well nourished and well developed, and appears to " have good nutritional status.  Alert and oriented to person and place, answers questions and cooperates with examination appropriately.   Head and Face - Normocephalic and atraumatic, with no gross asymmetry noted.  The facial nerve is intact, with strong symmetric movements.  Voice and Breathing - The patient was breathing comfortably without the use of accessory muscles. There was no wheezing, stridor, or stertor.  The patients voice was clear and strong, and had appropriate pitch and quality.  Ears - Ears were examined with otoscope and under otologic microscopy. The external auditory canals are patent, the tympanic membranes are intact without effusion, retraction or mass.  Bony landmarks are intact.  Eyes - Extraocular movements intact, and the pupils were reactive to light.  Sclera were not icteric or injected, conjunctiva were pink and moist.  Mouth - Examination of the oral cavity showed pink, healthy oral mucosa. No lesions or ulcerations noted.  The tongue was mobile and midline, and the dentition were in good condition.    Throat - The walls of the oropharynx were smooth, pink, moist, symmetric, and had no lesions or ulcerations. Crowded posterior oropharynx.   The tonsillar pillars and soft palate were symmetric.  The uvula was midline on elevation.  FTPIII  Neck - Normal midline excursion of the laryngotracheal complex during swallowing.  Full range of motion on passive movement.  Palpation of the occipital, submental, submandibular, internal jugular chain, and supraclavicular nodes did not demonstrate any abnormal lymph nodes or masses.  Palpation of the thyroid was soft and smooth, with no nodules or goiter appreciated.  The trachea was mobile and midline.  Nose - External contour is symmetric, no gross deflection or scars.  Nasal mucosa is pink and moist with no abnormal mucus.  The septum and turbinates were evaluated:Dns to Right. Nasal turbinate hypertrophy.   Heart- Regular rate  Lungs- Clear A/  P    Flexible Endoscopy -   PEDS- Right  The patient was counseled that their symptoms and history require a direct visualization with an endoscopy procedure.  They understood and we proceeded with a fiberoptic examination.  First I sprayed both sides of the nose with a mixture of lidocaine and neosynephrine.  I then passed the scope through the nasal cavity.   The nasal cavity was with DNS to right with lateral wall contact. Left IT< MT edematous boggy. The nasopharynx was mucosally covered and symmetric with thick drainage throughout, cobblestoning The Eustachian tube openings were edematous.   Going further down I had a clear view of the base of tongue which had normal appearing lingual tonsillar tissue.  The base of tongue was free of lesions, and the vallecula was open.  The epiglottis was smooth and mucosally covered.  The supraglottic larynx was then clearly visualized.  The vocal cords moved smoothly and symmetrically, they were pearly white and no lesions were seen.  The pyriform sinuses were open, and the limited view of the postcricoid region did not show any lesions.          Impression and Plan- Shania DARIA Hammond is a 49 year old female with:    ICD-10-CM    1. Post-nasal drainage  R09.82 predniSONE (DELTASONE) 20 MG tablet     cetirizine (ZYRTEC) 10 MG tablet     beclomethasone (QNASL) 80 MCG/ACT nasal aerosol      2. Subacute cough  R05.2 albuterol (PROAIR HFA/PROVENTIL HFA/VENTOLIN HFA) 108 (90 Base) MCG/ACT inhaler      3. Dysfunction of both eustachian tubes  H69.93 predniSONE (DELTASONE) 20 MG tablet     cetirizine (ZYRTEC) 10 MG tablet     beclomethasone (QNASL) 80 MCG/ACT nasal aerosol      4. DNS (deviated nasal septum)  J34.2       5. Nasal turbinate hypertrophy  J34.3               Tobacco cessation recommended    Start Prednisone 20 mg daily for 5 days  Start Qnasl 1-2 sprays to each nostril daily.   Start daily Zyrtec.     Follow up in 1 month with audiogram.   If cough persistent, consider  Xray and PFT.     Reassured ears appear clear, but significant post nasal drainage and ET edema present. Recommended nasal sprays to provide relief, she did not tolerate Flonase in past will try to obtain Qnasl.     Risks of oral steroid use were discussed and include psychiatric/mood changes, insomnia, stomach ulcers and potential GI bleeding, blood sugar elevation/worsening diabetes, hip/bone necrosis called avascular necrosis, or bone demineralization.      Maintain good water intake  Low sodium diet.       Hanane Osborn PA-C  ENT  [facility], [place]

## 2024-09-12 NOTE — LETTER
2024      Shania Hammond  [address] [facility] [address]      Dear Colleague,    Thank you for referring your patient, Shania Hammond, to the [facility]. Please see a copy of my visit note below.    Otolaryngology Consultation     Patient: Shania Hammond  : 1975     Patient presents with:  Throat Problem: Throat issues        HPI:  Shania Hammond is a 49 year old female seen today for throat concerns.   She was seen in  on 24 and treated for acute sinusitis, acute otitis media.      Patient presents for concerns of lymphadenopathy, sore throat and took treatment of abx with some relief. She had exposure of sawdust and increase in post nasal drainage, coughing.   She has developed significant post nasal drainage. She does have sensation of a breeze in her ears. She did not feel substantial improvement with her symptoms.          Patient denies sore throat or throat pain  Denies chronic otalgia.   Denies weight loss. She feels hot flashes, sweating.   Denies dysphagia or dysphonia.   She feels her voice is hoarse related to the drainage/ throat clearing.   Denies globus sensation.      Water- 2 large bottles.   Caffeine- one cup coffee  ETOH- social, 2/ week  Tobacco- 1 ppd.   Reflux- no. Improved since holding medications.     She has felt no significant ear pain. She had felt water in her ear. She has felt intermittent popping/ crackling w/ her ears.   Denies  otologic surgeries. Hx of childhood COM.   Denies otalgia, otorrhea  Denies worrisome tinnitus  Denies fluctuating hearing loss or tinnitus.   Denies vertigo or facial paraesthesia.     Allergies- she does have mild seasonal allergies.   No recent use of nasal sprays.   She had severe side effect of nasal sprays. Past trial of sinus rinses- no.   No recent AH use.   No recent hearing test.     Current Outpatient Rx          Current Outpatient Rx   Medication Sig Dispense Refill     cholecalciferol (VITAMIN D3) 5000  units (125 mcg) capsule TAKE 1 CAPSULE BY MOUTH ONCE DAILY   0     ibuprofen (ADVIL/MOTRIN) 800 MG tablet Take 1 tablet (800 mg) by mouth every 8 hours as needed for pain 30 tablet 1     loratadine (CLARITIN) 10 MG tablet Take 10 mg by mouth daily         Multiple Vitamin (MULTIVITAMIN ADULT PO) Take 1 tablet by mouth daily         vitamin B complex with vitamin C (VITAMIN  B COMPLEX) tablet Take 1 tablet by mouth daily                Allergies: Morphine, Bactrim [sulfamethoxazole w-trimethoprim], and Amoxicillin      Past Medical History        Past Medical History:   Diagnosis Date     Abnormal weight gain      Resolved; Pineda Angel     Allergic rhinitis, cause unspecified 2011     Asthma       Depressive disorder, not elsewhere classified 2007     Diabetes (H)       Migraine       Tobacco abuse              Past Surgical History         Past Surgical History:   Procedure Laterality Date      SECTION   ,      DILATION AND CURETTAGE   ?     DILATION AND CURETTAGE, HYSTEROSCOPY, ABLATE ENDOMETRIUM, COMBINED N/A 2019     Procedure: HYSTEROSCOPY DILATION AND CURETTAGE AND ENDOMETRIAL ABLATION;  Surgeon: Hussein Carrizales MD;  Location: [facility]     EGD with biopsy        Gastroesophageal reflux disease     GYN SURGERY         , leep            ENT family history reviewed     Social History               Tobacco Use     Smoking status: Every Day       Current packs/day: 0.50       Average packs/day: 0.5 packs/day for 1 year (0.5 ttl pk-yrs)       Types: Cigarettes     Smokeless tobacco: Never     Tobacco comments:       Passive Exposure: Yes   Substance Use Topics     Alcohol use: Yes       Alcohol/week: 0.0 standard drinks of alcohol       Comment: rare/ 4 drink max once a week or sometimes more     Drug use: No            Review of Systems  ROS: 10 point ROS neg other than the symptoms noted above in the HPI      Physical Exam  /81 (BP Location: Right arm,  "Patient Position: Sitting, Cuff Size: Adult Large)   Pulse 98   Temp 98.1  F (36.7  C) (Tympanic)   Ht 1.626 m (5' 4\")   Wt 91.6 kg (202 lb)   SpO2 97%   BMI 34.67 kg/m      General - The patient is well nourished and well developed, and appears to have good nutritional status.  Alert and oriented to person and place, answers questions and cooperates with examination appropriately.   Head and Face - Normocephalic and atraumatic, with no gross asymmetry noted.  The facial nerve is intact, with strong symmetric movements.  Voice and Breathing - The patient was breathing comfortably without the use of accessory muscles. There was no wheezing, stridor, or stertor.  The patients voice was clear and strong, and had appropriate pitch and quality.  Ears - Ears were examined with otoscope and under otologic microscopy. The external auditory canals are patent, the tympanic membranes are intact without effusion, retraction or mass.  Bony landmarks are intact.  Eyes - Extraocular movements intact, and the pupils were reactive to light.  Sclera were not icteric or injected, conjunctiva were pink and moist.  Mouth - Examination of the oral cavity showed pink, healthy oral mucosa. No lesions or ulcerations noted.  The tongue was mobile and midline, and the dentition were in good condition.    Throat - The walls of the oropharynx were smooth, pink, moist, symmetric, and had no lesions or ulcerations. Crowded posterior oropharynx.   The tonsillar pillars and soft palate were symmetric.  The uvula was midline on elevation.  FTPIII  Neck - Normal midline excursion of the laryngotracheal complex during swallowing.  Full range of motion on passive movement.  Palpation of the occipital, submental, submandibular, internal jugular chain, and supraclavicular nodes did not demonstrate any abnormal lymph nodes or masses.  Palpation of the thyroid was soft and smooth, with no nodules or goiter appreciated.  The trachea was mobile and " midline.  Nose - External contour is symmetric, no gross deflection or scars.  Nasal mucosa is pink and moist with no abnormal mucus.  The septum and turbinates were evaluated:Dns to Right. Nasal turbinate hypertrophy.   Heart- Regular rate  Lungs- Clear A/ P    Flexible Endoscopy -   PEDS- Right  The patient was counseled that their symptoms and history require a direct visualization with an endoscopy procedure.  They understood and we proceeded with a fiberoptic examination.  First I sprayed both sides of the nose with a mixture of lidocaine and neosynephrine.  I then passed the scope through the nasal cavity.   The nasal cavity was with DNS to right with lateral wall contact. Left IT< MT edematous boggy. The nasopharynx was mucosally covered and symmetric with thick drainage throughout, cobblestoning The Eustachian tube openings were edematous.   Going further down I had a clear view of the base of tongue which had normal appearing lingual tonsillar tissue.  The base of tongue was free of lesions, and the vallecula was open.  The epiglottis was smooth and mucosally covered.  The supraglottic larynx was then clearly visualized.  The vocal cords moved smoothly and symmetrically, they were pearly white and no lesions were seen.  The pyriform sinuses were open, and the limited view of the postcricoid region did not show any lesions.          Impression and Plan- Shania Hammond is a 49 year old female with:    ICD-10-CM    1. Post-nasal drainage  R09.82 predniSONE (DELTASONE) 20 MG tablet     cetirizine (ZYRTEC) 10 MG tablet     beclomethasone (QNASL) 80 MCG/ACT nasal aerosol      2. Subacute cough  R05.2 albuterol (PROAIR HFA/PROVENTIL HFA/VENTOLIN HFA) 108 (90 Base) MCG/ACT inhaler      3. Dysfunction of both eustachian tubes  H69.93 predniSONE (DELTASONE) 20 MG tablet     cetirizine (ZYRTEC) 10 MG tablet     beclomethasone (QNASL) 80 MCG/ACT nasal aerosol      4. DNS (deviated nasal septum)  J34.2       5. Nasal  turbinate hypertrophy  J34.3               Tobacco cessation recommended    Start Prednisone 20 mg daily for 5 days  Start Qnasl 1-2 sprays to each nostril daily.   Start daily Zyrtec.     Follow up in 1 month with audiogram.   If cough persistent, consider Xray and PFT.     Reassured ears appear clear, but significant post nasal drainage and ET edema present. Recommended nasal sprays to provide relief, she did not tolerate Flonase in past will try to obtain Qnasl.     Risks of oral steroid use were discussed and include psychiatric/mood changes, insomnia, stomach ulcers and potential GI bleeding, blood sugar elevation/worsening diabetes, hip/bone necrosis called avascular necrosis, or bone demineralization.      Maintain good water intake  Low sodium diet.       Hanane Osborn PA-C  ENT  [facility], [place]           Again, thank you for allowing me to participate in the care of your patient.        Sincerely,        Hanane Osborn PA-C

## 2024-09-12 NOTE — PATIENT INSTRUCTIONS
Start Prednisone 20 mg daily for 5 days.   Start Qnasl 1-2 sprays to each nostril daily.   Start Zyrtec 10 mg daily     Complete audiogram  Follow up in 1 month.     Thank you for allowing Hanane Osborn PA-C and our ENT team to participate in your care.  If your medications are too expensive, please give the nurse a call.  We can possibly change this medication.  If you have a scheduling or an appointment question please contact our Health Unit Coordinator at [phone], Ext. [phone].    ALL nursing questions or concerns can be directed to your ENT nurse at: [phone] Tanna

## 2024-09-13 NOTE — TELEPHONE ENCOUNTER
[facility] becl[facility]asone (QNASL) 80 MCG/ACT nasal aerosol. Letter scanned into EPIC

## 2024-12-05 DIAGNOSIS — H91.93 DECREASED HEARING OF BOTH EARS: Primary | ICD-10-CM

## 2024-12-12 ENCOUNTER — OFFICE VISIT (OUTPATIENT)
Dept: AUDIOLOGY | Facility: OTHER | Age: 49
End: 2024-12-12
Attending: AUDIOLOGIST
Payer: COMMERCIAL

## 2024-12-12 DIAGNOSIS — H91.93 DECREASED HEARING OF BOTH EARS: ICD-10-CM

## 2024-12-12 DIAGNOSIS — H93.8X9 PLUGGED FEELING IN EAR: Primary | ICD-10-CM

## 2024-12-12 DIAGNOSIS — H93.8X3 SENSATION OF PLUGGED EAR ON BOTH SIDES: ICD-10-CM

## 2024-12-12 PROCEDURE — 92557 COMPREHENSIVE HEARING TEST: CPT | Performed by: AUDIOLOGIST

## 2024-12-12 PROCEDURE — 92567 TYMPANOMETRY: CPT | Performed by: AUDIOLOGIST

## 2024-12-12 NOTE — PROGRESS NOTES
Audiology Evaluation Completed. Please refer SCANNED AUDIOGRAM and/or TYMPANOGRAM for BACKGROUND, RESULTS, RECOMMENDATIONS.      Mary EVANS, [facility]  Audiologist #[id number]

## 2024-12-23 ENCOUNTER — HOSPITAL ENCOUNTER (OUTPATIENT)
Dept: CT IMAGING | Facility: HOSPITAL | Age: 49
Discharge: HOME | End: 2024-12-23
Attending: PHYSICIAN ASSISTANT | Admitting: PHYSICIAN ASSISTANT
Payer: COMMERCIAL

## 2024-12-23 DIAGNOSIS — J34.3 NASAL TURBINATE HYPERTROPHY: ICD-10-CM

## 2024-12-23 DIAGNOSIS — J32.9 RECURRENT SINUSITIS: ICD-10-CM

## 2024-12-23 DIAGNOSIS — J34.2 DNS (DEVIATED NASAL SEPTUM): ICD-10-CM

## 2024-12-23 DIAGNOSIS — R09.82 POST-NASAL DRAINAGE: ICD-10-CM

## 2024-12-23 PROCEDURE — 70486 CT MAXILLOFACIAL W/O DYE: CPT

## 2025-01-04 ENCOUNTER — HEALTH MAINTENANCE LETTER (OUTPATIENT)
Age: 50
End: 2025-01-04

## 2025-02-26 ENCOUNTER — TELEPHONE (OUTPATIENT)
Dept: ALLERGY | Facility: OTHER | Age: 50
End: 2025-02-26

## 2025-02-26 NOTE — TELEPHONE ENCOUNTER
Call to patient. Verified last name and . Reminded patient of allergy test on 3/6/2025. Patient aware of of testing time and medications that need to be held. Patient had no questions or concerns at call completion.

## 2025-03-03 ENCOUNTER — TELEPHONE (OUTPATIENT)
Dept: ALLERGY | Facility: OTHER | Age: 50
End: 2025-03-03

## 2025-03-03 DIAGNOSIS — T78.40XA ALLERGY, INITIAL ENCOUNTER: Primary | ICD-10-CM

## 2025-04-03 ENCOUNTER — MEDICAL CORRESPONDENCE (OUTPATIENT)
Dept: HEALTH INFORMATION MANAGEMENT | Facility: HOSPITAL | Age: 50
End: 2025-04-03

## 2025-04-03 ENCOUNTER — TELEPHONE (OUTPATIENT)
Dept: ALLERGY | Facility: OTHER | Age: 50
End: 2025-04-03

## 2025-04-03 DIAGNOSIS — Z01.82 ENCOUNTER FOR ALLERGY TESTING: Primary | ICD-10-CM

## 2025-04-03 NOTE — TELEPHONE ENCOUNTER
Called patient to go over ACT questions prior to allergy test. Patient's asthma is controlled enough to have allergy test. She is aware of upcoming test and which medications need to be held.     In the past 4 weeks, home much of the time did your asthma keep you from getting as much done at work, school or at home? 5 None of the time  During the past 4 weeks, how often have you had shortness of breath? 5 Not at all  During the past 4 weeks, how often did your asthma symptoms (wheezing, coughing, shortness of breath, chest tightness or pain) wake you up at night or earlier than usual in the morning? 4 Once or twice  During the past 4 weeks, how often have you used your rescue inhaler or nebulizer medication (such as albuterol)? 5 Not at all  How would you rate your asthma control during the past 4 weeks? 4 Well controlled  Total Score: 23

## 2025-04-03 NOTE — TELEPHONE ENCOUNTER
Patient coming in for MQT test and needs a Claritin order. Order pended. Please sign if appropriate.

## 2025-04-19 ENCOUNTER — HEALTH MAINTENANCE LETTER (OUTPATIENT)
Age: 50
End: 2025-04-19

## 2025-06-18 ENCOUNTER — TELEPHONE (OUTPATIENT)
Dept: SURGERY | Facility: OTHER | Age: 50
End: 2025-06-18

## 2025-06-18 NOTE — TELEPHONE ENCOUNTER
Patient rescheduled from 7/10 to 6/26 due to cancellation for allergy testing. Meds reviewed and asthma control test questions asked. She has no further questions or concerns at this time but was encouraged to reach out if any occur.     In the past 4 weeks, home much of the time did your asthma keep you from getting as much done at work, school or at home? 5 None of the time    During the past 4 weeks, how often have you had shortness of breath? 5 Not at all    During the past 4 weeks, how often did your asthma symptoms (wheezing, coughing, shortness of breath, chest tightness or pain) wake you up at night or earlier than usual in the morning? 5 Not at all    During the past 4 weeks, how often have you used your rescue inhaler or nebulizer medication (such as albuterol)? 4 Once a week or less    How would you rate your asthma control during the past 4 weeks? 4 Well controlled    Total Score: 23     Lila Graff RN on 6/18/2025 at 1:07 PM

## 2025-06-26 ENCOUNTER — OFFICE VISIT (OUTPATIENT)
Dept: ALLERGY | Facility: OTHER | Age: 50
End: 2025-06-26
Attending: OTOLARYNGOLOGY
Payer: COMMERCIAL

## 2025-06-26 VITALS
DIASTOLIC BLOOD PRESSURE: 78 MMHG | TEMPERATURE: 99 F | SYSTOLIC BLOOD PRESSURE: 110 MMHG | HEART RATE: 87 BPM | OXYGEN SATURATION: 97 %

## 2025-06-26 DIAGNOSIS — Z01.82 ENCOUNTER FOR ALLERGY TESTING: Primary | ICD-10-CM

## 2025-06-26 PROCEDURE — 95004 PERQ TESTS W/ALRGNC XTRCS: CPT

## 2025-06-26 ASSESSMENT — PAIN SCALES - GENERAL: PAINLEVEL_OUTOF10: NO PAIN (0)

## 2025-06-26 NOTE — PROGRESS NOTES
This patient presents today for allergy skin testing.      Symptoms have included headaches, sinus pain, ear pain/fullness, sneezing, runny nose and are not worse in any particular season.    This patient lives in a single family home, with a  basement.  This patient does not suspect mold, water or moisture issues in the home.  There is carpet in the home, and is in the bedrooms.  Home has forced air heat and does not have air conditioning.        This patient has a dog for a pet.  They are inside.    This patient has had allergy testing in the past.    This patient's medications have been reviewed prior to testing and all appropriate medications have been stopped.    Consent is signed by patient and signature is verified.     MQT/ID test is performed per protocol.  The patient tolerated testing well.  All findings are recorded on the paper flow sheet. Results are reviewed with this patient.  They are given written information regarding allergy.       The patient will follow-up with Dr. Watson MD for treatment plan.      Lila Graff RN

## 2025-06-27 ENCOUNTER — HOSPITAL ENCOUNTER (EMERGENCY)
Facility: HOSPITAL | Age: 50
Discharge: HOME | End: 2025-06-27
Payer: COMMERCIAL

## 2025-06-27 ENCOUNTER — APPOINTMENT (OUTPATIENT)
Dept: GENERAL RADIOLOGY | Facility: HOSPITAL | Age: 50
End: 2025-06-27
Payer: COMMERCIAL

## 2025-06-27 ENCOUNTER — DOCUMENTATION ONLY (OUTPATIENT)
Dept: OTOLARYNGOLOGY | Facility: OTHER | Age: 50
End: 2025-06-27

## 2025-06-27 VITALS
RESPIRATION RATE: 16 BRPM | TEMPERATURE: 99.8 F | OXYGEN SATURATION: 98 % | SYSTOLIC BLOOD PRESSURE: 116 MMHG | HEART RATE: 78 BPM | DIASTOLIC BLOOD PRESSURE: 76 MMHG

## 2025-06-27 DIAGNOSIS — S80.02XA CONTUSION OF LEFT KNEE, INITIAL ENCOUNTER: ICD-10-CM

## 2025-06-27 DIAGNOSIS — M25.562 ACUTE PAIN OF LEFT KNEE: ICD-10-CM

## 2025-06-27 DIAGNOSIS — Z91.018 FOOD ALLERGY: Primary | ICD-10-CM

## 2025-06-27 PROCEDURE — 73562 X-RAY EXAM OF KNEE 3: CPT | Mod: 26 | Performed by: RADIOLOGY

## 2025-06-27 PROCEDURE — 73562 X-RAY EXAM OF KNEE 3: CPT | Mod: LT

## 2025-06-27 PROCEDURE — 99213 OFFICE O/P EST LOW 20 MIN: CPT

## 2025-06-27 PROCEDURE — G0463 HOSPITAL OUTPT CLINIC VISIT: HCPCS

## 2025-06-27 ASSESSMENT — ENCOUNTER SYMPTOMS
ACTIVITY CHANGE: 1
FEVER: 0
WOUND: 0
COLOR CHANGE: 1
APPETITE CHANGE: 0
JOINT SWELLING: 1
ARTHRALGIAS: 1
NUMBNESS: 0

## 2025-06-27 ASSESSMENT — COLUMBIA-SUICIDE SEVERITY RATING SCALE - C-SSRS
2. HAVE YOU ACTUALLY HAD ANY THOUGHTS OF KILLING YOURSELF IN THE PAST MONTH?: NO
6. HAVE YOU EVER DONE ANYTHING, STARTED TO DO ANYTHING, OR PREPARED TO DO ANYTHING TO END YOUR LIFE?: NO
1. IN THE PAST MONTH, HAVE YOU WISHED YOU WERE DEAD OR WISHED YOU COULD GO TO SLEEP AND NOT WAKE UP?: NO

## 2025-06-27 ASSESSMENT — ACTIVITIES OF DAILY LIVING (ADL): ADLS_ACUITY_SCORE: 41

## 2025-06-27 NOTE — ED PROVIDER NOTES
History     Chief Complaint   Patient presents with    Knee Pain     left     HPI  Shania Hammond is a 50 year old female who presents to the urgent care with complaints of left medial anterior knee pain after missing curb and falling on knee. Incident occurred 2 weeks ago. She has been able to bear weight and ambulate. No numbness/tingling or fevers. Has had a hot/cold sensation in knee. No icing, elevating, or OTC medications PTA.     Allergies:  Allergies   Allergen Reactions    Morphine      Pt remembers having a red line in arm when she received IV morphine.     Bactrim [Sulfamethoxazole W-Trimethoprim] Other (See Comments)     Very bad intolerance does not wish to take.    Amoxicillin Rash       Problem List:    Patient Active Problem List    Diagnosis Date Noted    Abnormal uterine bleeding 2019     Priority: Medium     Added automatically from request for surgery [id number]      Rosacea 2015     Priority: Medium    Allergic rhinitis 2015     Priority: Medium     Problem list name updated by automated process. Provider to review      Migraine 2015     Priority: Medium    Tobacco abuse      Priority: Medium    Postpartum care and examination immediately after delivery 2013     Priority: Medium    Adiposity 2012     Priority: Medium    Type 2 diabetes mellitus (H) 2012     Priority: Medium        Past Medical History:    Past Medical History:   Diagnosis Date    Abnormal weight gain     Allergic rhinitis, cause unspecified 2011    Asthma     Depressive disorder, not elsewhere classified 2007    Diabetes (H)     Migraine     Tobacco abuse        Past Surgical History:    Past Surgical History:   Procedure Laterality Date     SECTION  ,     DILATION AND CURETTAGE  ?    DILATION AND CURETTAGE, HYSTEROSCOPY, ABLATE ENDOMETRIUM, COMBINED N/A 2019    Procedure: HYSTEROSCOPY DILATION AND CURETTAGE AND ENDOMETRIAL ABLATION;  Surgeon:  Hussein Carrizales MD;  Location: [facility] OR    EGD with biopsy      Gastroesophageal reflux disease    GYN SURGERY  2013     , leep       Family History:    Family History   Problem Relation Age of Onset    Allergies Sister     Cancer Mother     Diabetes Mother     Hypertension Mother     Irritable Bowel Syndrome Mother     Osteoporosis Mother     Cancer Son         Neuroblastoma    C.A.D. Father          of MI at age 63    Attention Deficit Disorder Sister     Asthma Sister     Depression Sister     Learning Disorder Sister     Migraines Sister        Social History:  Marital Status:   [2]  Social History     Tobacco Use    Smoking status: Every Day     Current packs/day: 0.50     Average packs/day: 0.5 packs/day for 1 year (0.5 ttl pk-yrs)     Types: Cigarettes    Smokeless tobacco: Never    Tobacco comments:     Passive Exposure: Yes   Substance Use Topics    Alcohol use: Yes     Alcohol/week: 0.0 standard drinks of alcohol     Comment: rare/ 4 drink max once a week or sometimes more    Drug use: No        Medications:    albuterol (PROAIR HFA/PROVENTIL HFA/VENTOLIN HFA) 108 (90 Base) MCG/ACT inhaler  ibuprofen (ADVIL/MOTRIN) 800 MG tablet  loratadine (CLARITIN) 10 MG tablet  Multiple Vitamin (MULTIVITAMIN ADULT PO)  vitamin B complex with vitamin C (VITAMIN  B COMPLEX) tablet          Review of Systems   Constitutional:  Positive for activity change. Negative for appetite change and fever.   Musculoskeletal:  Positive for arthralgias and joint swelling.   Skin:  Positive for color change. Negative for wound.   Neurological:  Negative for numbness.   All other systems reviewed and are negative.      Physical Exam   BP: 116/76  Pulse: 78  Temp: 99.8  F (37.7  C)  Resp: 16  SpO2: 98 %      Physical Exam  Vitals and nursing note reviewed.   Constitutional:       General: She is not in acute distress.     Appearance: Normal appearance. She is not ill-appearing or toxic-appearing.   Cardiovascular:       Pulses: Normal pulses.   Musculoskeletal:         General: Swelling and tenderness present. No deformity.      Left knee: Swelling and bony tenderness present. Tenderness present over the medial joint line and patellar tendon. Normal pulse.      Left lower leg: No edema.   Skin:     General: Skin is warm and dry.      Capillary Refill: Capillary refill takes less than 2 seconds.      Findings: Bruising present. No erythema.   Neurological:      General: No focal deficit present.      Mental Status: She is alert and oriented to person, place, and time.   Psychiatric:         Mood and Affect: Mood normal.         Behavior: Behavior normal.         Thought Content: Thought content normal.         Judgment: Judgment normal.         ED Course        Procedures       Recent Results (from the past 24 hours)   XR Knee Left 3 Views    Narrative    EXAM: XR KNEE LEFT 3 VIEWS  LOCATION: [facility]  DATE: 6/27/2025    INDICATION: anterior medial pain, fall  COMPARISON: None.      Impression    IMPRESSION:     Very small marginal osteophytes, without significant joint space narrowing. Normal alignment. No sizable left knee joint effusion. No displaced fracture.       Medications - No data to display    Assessments & Plan (with Medical Decision Making)     I have reviewed the nursing notes.    I have reviewed the findings, diagnosis, plan and need for follow up with the patient.  Shania Hammond is a 50 year old female who presents to the urgent care with complaints of left medial anterior knee pain after missing curb and falling on knee. Incident occurred 2 weeks ago. She has been able to bear weight and ambulate. No numbness/tingling or fevers. Has had a hot/cold sensation in knee. No icing, elevating, or OTC medications PTA.     MDM: vital signs normal. Afebrile. Non toxic in appearance with no noted distress. Fully ambulatory. Strong pulses to left lower extremity. Swelling and bruising to left knee. No redness or  warmth. XR reviewed with very small marginal osteophytes, without significant joint space narrowing. Normal alignment. No sizable left knee joint effusion. No displaced fracture. Ace wrap placed. Encouraged RICE. Orthopedic referral placed. Supportive measures and return precautions discussed. She is in agreement with plan.     (M25.562) Acute pain of left knee, (S80.02XA) Contusion of left knee, initial encounter  Plan: Orthopedic  Referral  You can take 650-1000mg of tylenol every 6 hours as needed, max of 3000mg in 24 hours and 600-800mg of ibuprofen every 8 hours as needed, max of 2400mg in 24 hours.     Ice for 15-20 minutes every 2-3 hours. Please make sure to protect skin to prevent frost bite.     Return with any new or concerning symptoms.   Follow up with orthopedics. They will be calling to schedule an appt.  Understanding verbalized.       New Prescriptions    No medications on file       Final diagnoses:   Acute pain of left knee   Contusion of left knee, initial encounter       6/27/2025   [facility]       Deyanira Benton NP  06/27/25 [id number]

## 2025-06-27 NOTE — PROGRESS NOTES
Patient was seen for allergy testing yesterday, 6/26. Her follow-up visit was cancelled due to the provider being unavailable after testing. Patient will need to be rescheduled for follow-up. Wondering if this can be a virtual visit or should it be in person. Patient also requested a referral to be sent to [facility] for food allergy testing.

## 2025-06-27 NOTE — DISCHARGE INSTRUCTIONS
You can take 650-1000mg of tylenol every 6 hours as needed, max of 3000mg in 24 hours and 600-800mg of ibuprofen every 8 hours as needed, max of 2400mg in 24 hours.     Ice for 15-20 minutes every 2-3 hours. Please make sure to protect skin to prevent frost bite.     Return with any new or concerning symptoms.   Follow up with orthopedics. They will be calling to schedule an appt.

## 2025-06-30 NOTE — PROGRESS NOTES
Could be virtual visit, but she did have sinus changes and recommended to review options with Dr. Chaudhari.

## 2025-06-30 NOTE — PROGRESS NOTES
Notified allergy nurses and HUCs patient needs scheduled for MQT follow-up. Can schedule as virtual with Hanane or Lydia. Patient should have follow-up scheduled next available with Dr. Chaudhari due to sinus changes. She can do MQT Follow-up at that time, as well, if she would like, or virtual with Hanane/Lydia sooner.

## 2025-07-01 ENCOUNTER — TELEPHONE (OUTPATIENT)
Dept: OTOLARYNGOLOGY | Facility: OTHER | Age: 50
End: 2025-07-01

## 2025-07-01 NOTE — TELEPHONE ENCOUNTER
Patient needs follow-up for MQT as discussed last week. Per Hanane, patient can be seen as virtual visit for this; however, she should also be scheduled next available with KF due to sinus changes. If she would like to do both visits at the same time, she can wait until she sees KF to review MQT.

## 2025-07-09 ENCOUNTER — OFFICE VISIT (OUTPATIENT)
Dept: OTOLARYNGOLOGY | Facility: OTHER | Age: 50
End: 2025-07-09
Attending: PHYSICIAN ASSISTANT
Payer: COMMERCIAL

## 2025-07-09 VITALS
SYSTOLIC BLOOD PRESSURE: 102 MMHG | HEART RATE: 85 BPM | DIASTOLIC BLOOD PRESSURE: 66 MMHG | RESPIRATION RATE: 18 BRPM | TEMPERATURE: 98.8 F | OXYGEN SATURATION: 96 %

## 2025-07-09 DIAGNOSIS — R06.83 SNORING: ICD-10-CM

## 2025-07-09 DIAGNOSIS — J32.9 RECURRENT SINUSITIS: ICD-10-CM

## 2025-07-09 DIAGNOSIS — H69.93 DYSFUNCTION OF BOTH EUSTACHIAN TUBES: ICD-10-CM

## 2025-07-09 DIAGNOSIS — J34.2 DNS (DEVIATED NASAL SEPTUM): ICD-10-CM

## 2025-07-09 DIAGNOSIS — R53.83 OTHER FATIGUE: ICD-10-CM

## 2025-07-09 DIAGNOSIS — J34.3 NASAL TURBINATE HYPERTROPHY: ICD-10-CM

## 2025-07-09 DIAGNOSIS — J30.2 SEASONAL ALLERGIC RHINITIS, UNSPECIFIED TRIGGER: ICD-10-CM

## 2025-07-09 DIAGNOSIS — R09.82 POST-NASAL DRAINAGE: Primary | ICD-10-CM

## 2025-07-09 PROCEDURE — G0463 HOSPITAL OUTPT CLINIC VISIT: HCPCS

## 2025-07-09 ASSESSMENT — PAIN SCALES - GENERAL: PAINLEVEL_OUTOF10: NO PAIN (0)

## 2025-07-09 NOTE — PATIENT INSTRUCTIONS
Complete Sleep study. Referral placed for study.     Return for future lab draw.   Results take about 10-14 days to return.     Follow up with Dr. Chaudhari to review surgical options.   Continue with Nasal saline, daily antihistamine    Thank you for allowing ZORAIDA Alexis and our ENT team to participate in your care.  If your medications are too expensive, please give the nurse a call.  We can possibly change this medication.  If you have a scheduling or an appointment question please contact our Health Unit Coordinator at their direct line [phone].   ALL nursing questions or concerns can be directed to your ENT nurse, Tanna at: [phone].

## 2025-07-09 NOTE — PROGRESS NOTES
Chief Complaint   Patient presents with    Allergies     MQT Follow up           MQT- 6/26/25  Dilution #6- -  Dilution #5--[place]  Dilution #2- -     She returns to ENT for follow up exam. She had been feeling continued plugged feeling. She had been taking  medications for med and has been holding. Her sinuses   She reports she is not able to tolerate any nasal sprays/ rinses. She has felt the sprays worsen and cause her significant gagging/ emesis.   She reports her symptoms do remain present. No improvement   She does use nasal saline PRN with good results.        She feels congested, draining.   She does use Valasva manuevers which improved her symptoms.   Denies facial pain. She does have increase in congestion, throat discomfort.   She has been waking up with         Denies chronic otalgia.   Denies weight loss. She feels hot flashes, sweating.   Denies dysphagia or dysphonia.   She feels her voice is hoarse related to the drainage/ throat clearing.   Denies globus sensation.    Denies clenching, grinding.         Water- 2 large bottles.   Caffeine- one cup coffee  ETOH- social, 2/ week  Tobacco- 1 ppd.   Reflux- no. Improved since holding medications.      She has felt no significant ear pain. She had felt water in her ear. She has felt intermittent popping/ crackling w/ her ears.   Denies  otologic surgeries. Hx of childhood COM.   Denies otalgia, otorrhea  Denies worrisome tinnitus  Denies fluctuating hearing loss or tinnitus.   Denies vertigo or facial paraesthesia.      Allergies- she does have mild seasonal allergies. She did testing completed but years ago. Trees, dust, weeds.   No recent use of nasal sprays.   She had severe side effect of nasal sprays. Past trial of sinus rinses- no.        Resides in a house with a basement  There is no water or mold  Carpet in bedroom - rugs  Heat in home- Forced air  Animals- dogs, 2  Family hx of allergies - yes  Past trials of medications AH, Medications.          Audiogram  Type A tympanograms  Thresholds are normal range   SRT=PTA  WRS-  Right- 100%@55dB  Left- 100% @55 dB       ROS- SEE HPI  /66 (BP Location: Right arm, Patient Position: Sitting, Cuff Size: Adult Large)   Pulse 85   Temp 98.8  F (37.1  C) (Tympanic)   Resp 18   SpO2 96%   General - The patient is well nourished and well developed, and appears to have good nutritional status.  Alert and oriented to person and place, answers questions and cooperates with examination appropriately.   Head and Face - Normocephalic and atraumatic, with no gross asymmetry noted.  The facial nerve is intact, with strong symmetric movements.  Voice and Breathing - The patient was breathing comfortably without the use of accessory muscles. There was no wheezing, stridor, or stertor.  The patients voice was clear and strong, and had appropriate pitch and quality.  Ears - Ears were examined with otoscope and under otologic microscopy. The external auditory canals are patent, the tympanic membranes are intact without effusion, retraction or mass.  Bony landmarks are intact.  Eyes - Extraocular movements intact, and the pupils were reactive to light.  Sclera were not icteric or injected, conjunctiva were pink and moist.  Mouth - Examination of the oral cavity showed pink, healthy oral mucosa. No lesions or ulcerations noted.  The tongue was mobile and midline, and the dentition were in good condition.    Throat - The walls of the oropharynx were smooth, pink, moist, symmetric, and had no lesions or ulcerations. Crowded posterior oropharynx.   The tonsillar pillars and soft palate were symmetric.  The uvula was midline on elevation.  FTPIII  Neck - Normal midline excursion of the laryngotracheal complex during swallowing.  Full range of motion on passive movement.  Palpation of the occipital, submental, submandibular, internal jugular chain, and supraclavicular nodes did not demonstrate any abnormal lymph nodes or masses.   Palpation of the thyroid was soft and smooth, with no nodules or goiter appreciated.  The trachea was mobile and midline.  Nose - External contour is symmetric, no gross deflection or scars.  Nasal mucosa is pink and moist with no abnormal mucus.  The septum and turbinates were evaluated:Dns to Right. Nasal turbinate hypertrophy.         Last Nasal exam- thick rhinorrhea/ post nasal drainage throughout w/ ET edema.            ASSESSMENT/ PLAN:    ICD-10-CM    1. Post-nasal drainage  R09.82 Inhalent Panel [place] Region ([employer])      2. Dysfunction of both eustachian tubes  H69.93       3. Nasal turbinate hypertrophy  J34.3       4. Seasonal allergic rhinitis, unspecified trigger  J30.2 Inhalent Panel [place] ([employer]olab)      5. Recurrent sinusitis  J32.9 Inhalent Panel [place] ([employer]ab)      6. DNS (deviated nasal septum)  J34.2       7. Other fatigue  R53.83 Adult Sleep Eval & Management  Referral      8. Snoring  R06.83 Adult Sleep Eval & Management  Referral            Follow up w/ Dr. Chaudhari to review options of Septo, TR, ESS.   Briefly reviewed CT and surgical options today    Continue with nasal saline    Sleep study referral placed.       Hanane Osborn PA-C  ENT  [facility], [place]

## 2025-07-09 NOTE — LETTER
7/9/2025      Shania Hammond  [address] [id number]      Dear Colleague,    Thank you for referring your patient, Shania Hammond, to the [facility]. Please see a copy of my visit note below.    Chief Complaint   Patient presents with     Allergies     MQT Follow up           MQT- 6/26/25  Dilution #6- -  Dilution #5--[place]  Dilution #2- -     She returns to ENT for follow up exam. She had been feeling continued plugged feeling. She had been taking  medications for med and has been holding. Her sinuses   She reports she is not able to tolerate any nasal sprays/ rinses. She has felt the sprays worsen and cause her significant gagging/ emesis.   She reports her symptoms do remain present. No improvement   She does use nasal saline PRN with good results.        She feels congested, draining.   She does use Valasva manuevers which improved her symptoms.   Denies facial pain. She does have increase in congestion, throat discomfort.   She has been waking up with         Denies chronic otalgia.   Denies weight loss. She feels hot flashes, sweating.   Denies dysphagia or dysphonia.   She feels her voice is hoarse related to the drainage/ throat clearing.   Denies globus sensation.    Denies clenching, grinding.         Water- 2 large bottles.   Caffeine- one cup coffee  ETOH- social, 2/ week  Tobacco- 1 ppd.   Reflux- no. Improved since holding medications.      She has felt no significant ear pain. She had felt water in her ear. She has felt intermittent popping/ crackling w/ her ears.   Denies  otologic surgeries. Hx of childhood COM.   Denies otalgia, otorrhea  Denies worrisome tinnitus  Denies fluctuating hearing loss or tinnitus.   Denies vertigo or facial paraesthesia.      Allergies- she does have mild seasonal allergies. She did testing completed but years ago. Trees, dust, weeds.   No recent use of nasal sprays.   She had severe side effect of nasal sprays. Past trial of sinus  rinses- no.        Resides in a house with a basement  There is no water or mold  Carpet in bedroom - rugs  Heat in home- Forced air  Animals- dogs, 2  Family hx of allergies - yes  Past trials of medications AH, Medications.         Audiogram  Type A tympanograms  Thresholds are normal range   SRT=PTA  WRS-  Right- 100%@55dB  Left- 100% @55 dB       ROS- SEE HPI  /66 (BP Location: Right arm, Patient Position: Sitting, Cuff Size: Adult Large)   Pulse 85   Temp 98.8  F (37.1  C) (Tympanic)   Resp 18   SpO2 96%   General - The patient is well nourished and well developed, and appears to have good nutritional status.  Alert and oriented to person and place, answers questions and cooperates with examination appropriately.   Head and Face - Normocephalic and atraumatic, with no gross asymmetry noted.  The facial nerve is intact, with strong symmetric movements.  Voice and Breathing - The patient was breathing comfortably without the use of accessory muscles. There was no wheezing, stridor, or stertor.  The patients voice was clear and strong, and had appropriate pitch and quality.  Ears - Ears were examined with otoscope and under otologic microscopy. The external auditory canals are patent, the tympanic membranes are intact without effusion, retraction or mass.  Bony landmarks are intact.  Eyes - Extraocular movements intact, and the pupils were reactive to light.  Sclera were not icteric or injected, conjunctiva were pink and moist.  Mouth - Examination of the oral cavity showed pink, healthy oral mucosa. No lesions or ulcerations noted.  The tongue was mobile and midline, and the dentition were in good condition.    Throat - The walls of the oropharynx were smooth, pink, moist, symmetric, and had no lesions or ulcerations. Crowded posterior oropharynx.   The tonsillar pillars and soft palate were symmetric.  The uvula was midline on elevation.  FTPIII  Neck - Normal midline excursion of the laryngotracheal  complex during swallowing.  Full range of motion on passive movement.  Palpation of the occipital, submental, submandibular, internal jugular chain, and supraclavicular nodes did not demonstrate any abnormal lymph nodes or masses.  Palpation of the thyroid was soft and smooth, with no nodules or goiter appreciated.  The trachea was mobile and midline.  Nose - External contour is symmetric, no gross deflection or scars.  Nasal mucosa is pink and moist with no abnormal mucus.  The septum and turbinates were evaluated:Dns to Right. Nasal turbinate hypertrophy.         Last Nasal exam- thick rhinorrhea/ post nasal drainage throughout w/ ET edema.            ASSESSMENT/ PLAN:    ICD-10-CM    1. Post-nasal drainage  R09.82 Inhalent Panel [place] ([employer])      2. Dysfunction of both eustachian tubes  H69.93       3. Nasal turbinate hypertrophy  J34.3       4. Seasonal allergic rhinitis, unspecified trigger  J30.2 Inhalent Panel [place] ([employer]ab)      5. Recurrent sinusitis  J32.9 Inhalent Panel [place] ([employer]ab)      6. DNS (deviated nasal septum)  J34.2       7. Other fatigue  R53.83 Adult Sleep Eval & Management  Referral      8. Snoring  R06.83 Adult Sleep Eval & Management  Referral            Follow up w/ Dr. Chaudhari to review options of Septo, TR, ESS.   Briefly reviewed CT and surgical options today    Continue with nasal saline    Sleep study referral placed.       Hanane Osborn PA-C  ENT  [facility], [place]      Again, thank you for allowing me to participate in the care of your patient.        Sincerely,        Hanane Osborn PA-C    Electronically signed

## 2025-08-02 ENCOUNTER — HEALTH MAINTENANCE LETTER (OUTPATIENT)
Age: 50
End: 2025-08-02

## 2025-08-07 ENCOUNTER — TELEPHONE (OUTPATIENT)
Dept: OTOLARYNGOLOGY | Facility: OTHER | Age: 50
End: 2025-08-07

## 2025-08-11 ENCOUNTER — LAB (OUTPATIENT)
Dept: LAB | Facility: OTHER | Age: 50
End: 2025-08-11
Payer: COMMERCIAL

## 2025-08-11 DIAGNOSIS — J32.9 RECURRENT SINUSITIS: ICD-10-CM

## 2025-08-11 DIAGNOSIS — R09.82 POST-NASAL DRAINAGE: ICD-10-CM

## 2025-08-11 DIAGNOSIS — J30.2 SEASONAL ALLERGIC RHINITIS, UNSPECIFIED TRIGGER: ICD-10-CM

## 2025-08-11 PROCEDURE — 82785 ASSAY OF IGE: CPT | Mod: ZL

## 2025-08-11 PROCEDURE — 36415 COLL VENOUS BLD VENIPUNCTURE: CPT | Mod: ZL

## 2025-08-26 ENCOUNTER — APPOINTMENT (OUTPATIENT)
Dept: CT IMAGING | Facility: HOSPITAL | Age: 50
End: 2025-08-26
Attending: NURSE PRACTITIONER
Payer: COMMERCIAL

## 2025-08-26 ENCOUNTER — APPOINTMENT (OUTPATIENT)
Dept: MRI IMAGING | Facility: HOSPITAL | Age: 50
End: 2025-08-26
Attending: NURSE PRACTITIONER
Payer: COMMERCIAL

## 2025-08-26 ENCOUNTER — HOSPITAL ENCOUNTER (EMERGENCY)
Facility: HOSPITAL | Age: 50
Discharge: HOME | End: 2025-08-26
Attending: NURSE PRACTITIONER
Payer: COMMERCIAL

## 2025-08-26 PROCEDURE — 70498 CT ANGIOGRAPHY NECK: CPT

## 2025-08-26 PROCEDURE — 70450 CT HEAD/BRAIN W/O DYE: CPT

## 2025-08-26 ASSESSMENT — ACTIVITIES OF DAILY LIVING (ADL)
ADLS_ACUITY_SCORE: 41

## 2025-08-26 ASSESSMENT — ENCOUNTER SYMPTOMS
RESPIRATORY NEGATIVE: 1
WEAKNESS: 1
EYES NEGATIVE: 1
SEIZURES: 0
GASTROINTESTINAL NEGATIVE: 1
ENDOCRINE NEGATIVE: 1
CARDIOVASCULAR NEGATIVE: 1
FACIAL ASYMMETRY: 0
MUSCULOSKELETAL NEGATIVE: 1
HEMATOLOGIC/LYMPHATIC NEGATIVE: 1
LIGHT-HEADEDNESS: 0
CONSTITUTIONAL NEGATIVE: 1
PSYCHIATRIC NEGATIVE: 1
NUMBNESS: 1
ALLERGIC/IMMUNOLOGIC NEGATIVE: 1
DIZZINESS: 1

## 2025-08-26 ASSESSMENT — COLUMBIA-SUICIDE SEVERITY RATING SCALE - C-SSRS
2. HAVE YOU ACTUALLY HAD ANY THOUGHTS OF KILLING YOURSELF IN THE PAST MONTH?: NO
1. IN THE PAST MONTH, HAVE YOU WISHED YOU WERE DEAD OR WISHED YOU COULD GO TO SLEEP AND NOT WAKE UP?: NO
6. HAVE YOU EVER DONE ANYTHING, STARTED TO DO ANYTHING, OR PREPARED TO DO ANYTHING TO END YOUR LIFE?: NO

## 2025-08-27 LAB — SCANNED LAB RESULT: NORMAL

## (undated) DEVICE — SOL-NACL 0.9% 1000ML

## (undated) DEVICE — LABEL-STERILE PREPRINTED FOR OR

## (undated) DEVICE — SPONGE-LAPAROTOMY PADS 18 X 18

## (undated) DEVICE — PRESSURE INFUSOR DISP. 3000CC

## (undated) DEVICE — NDL-SPINAL 22G X 3.5IN QUINCKE

## (undated) DEVICE — DRSG-SPONGE X-RAY 4 X 4

## (undated) DEVICE — DRAPE-STERI 45X60CM #1010

## (undated) DEVICE — CATH-URETHRAL 14FR

## (undated) DEVICE — ENDOMETRIAL ABLATION DEVICE-NOVASURE

## (undated) DEVICE — CANISTER-SUCTION 2000CC

## (undated) DEVICE — LIGHT HANDLE COVER

## (undated) DEVICE — IRRIGATION-NACL 3000ML (BAG)

## (undated) DEVICE — TUBING-INFLOW HYSTEROPUMP

## (undated) DEVICE — TRAY-SKIN PREP POVIDONE/IODINE

## (undated) DEVICE — SANITARY NAPKINS-SINGLE

## (undated) DEVICE — PACK-GYN CYSTO-CUSTOM

## (undated) DEVICE — DRSG-NON ADHERING 3 X 8 TELFA

## (undated) DEVICE — TUBING-OUTFLOW HYSTEROPUMP

## (undated) DEVICE — GLV-8.5 BIOGEL LATEX

## (undated) DEVICE — IRRIGATION-H2O 1000ML

## (undated) RX ORDER — KETAMINE HCL IN NACL, ISO-OSM 100MG/10ML
SYRINGE (ML) INJECTION
Status: DISPENSED
Start: 2019-12-06

## (undated) RX ORDER — PROPOFOL 10 MG/ML
INJECTION, EMULSION INTRAVENOUS
Status: DISPENSED
Start: 2019-12-06

## (undated) RX ORDER — ONDANSETRON 2 MG/ML
INJECTION INTRAMUSCULAR; INTRAVENOUS
Status: DISPENSED
Start: 2019-12-06

## (undated) RX ORDER — DEXAMETHASONE SODIUM PHOSPHATE 10 MG/ML
INJECTION, SOLUTION INTRAMUSCULAR; INTRAVENOUS
Status: DISPENSED
Start: 2019-12-06